# Patient Record
Sex: FEMALE | Race: OTHER | HISPANIC OR LATINO
[De-identification: names, ages, dates, MRNs, and addresses within clinical notes are randomized per-mention and may not be internally consistent; named-entity substitution may affect disease eponyms.]

---

## 2017-01-18 ENCOUNTER — APPOINTMENT (OUTPATIENT)
Dept: OBGYN | Facility: CLINIC | Age: 32
End: 2017-01-18

## 2017-04-18 ENCOUNTER — EMERGENCY (EMERGENCY)
Facility: HOSPITAL | Age: 32
LOS: 1 days | Discharge: HOME | End: 2017-04-18
Attending: EMERGENCY MEDICINE
Payer: SELF-PAY

## 2017-04-18 VITALS
TEMPERATURE: 98 F | DIASTOLIC BLOOD PRESSURE: 82 MMHG | RESPIRATION RATE: 20 BRPM | HEART RATE: 87 BPM | SYSTOLIC BLOOD PRESSURE: 126 MMHG | WEIGHT: 141.98 LBS | OXYGEN SATURATION: 98 % | HEIGHT: 59 IN

## 2017-04-18 VITALS
TEMPERATURE: 98 F | OXYGEN SATURATION: 99 % | RESPIRATION RATE: 18 BRPM | DIASTOLIC BLOOD PRESSURE: 71 MMHG | SYSTOLIC BLOOD PRESSURE: 121 MMHG | HEART RATE: 77 BPM

## 2017-04-18 DIAGNOSIS — M54.2 CERVICALGIA: ICD-10-CM
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
## 2017-04-18 LAB — HCG UR QL: NEGATIVE — SIGNIFICANT CHANGE UP

## 2017-04-18 PROCEDURE — 99284 EMERGENCY DEPT VISIT MOD MDM: CPT

## 2017-04-18 PROCEDURE — 72125 CT NECK SPINE W/O DYE: CPT | Mod: 26

## 2017-04-18 PROCEDURE — 70450 CT HEAD/BRAIN W/O DYE: CPT

## 2017-04-18 PROCEDURE — 81025 URINE PREGNANCY TEST: CPT

## 2017-04-18 PROCEDURE — 70450 CT HEAD/BRAIN W/O DYE: CPT | Mod: 26

## 2017-04-18 PROCEDURE — 72125 CT NECK SPINE W/O DYE: CPT

## 2017-04-18 RX ORDER — DIAZEPAM 5 MG
1 TABLET ORAL
Qty: 15 | Refills: 0
Start: 2017-04-18 | End: 2017-04-23

## 2017-04-18 RX ORDER — IBUPROFEN 200 MG
600 TABLET ORAL ONCE
Qty: 0 | Refills: 0 | Status: COMPLETED | OUTPATIENT
Start: 2017-04-18 | End: 2017-04-18

## 2017-04-18 RX ORDER — IBUPROFEN 200 MG
1 TABLET ORAL
Qty: 20 | Refills: 0
Start: 2017-04-18 | End: 2017-04-23

## 2017-04-18 RX ORDER — DIAZEPAM 5 MG
5 TABLET ORAL ONCE
Qty: 0 | Refills: 0 | Status: DISCONTINUED | OUTPATIENT
Start: 2017-04-18 | End: 2017-04-18

## 2017-04-18 RX ADMIN — Medication 600 MILLIGRAM(S): at 14:14

## 2017-04-18 RX ADMIN — Medication 5 MILLIGRAM(S): at 12:53

## 2017-04-18 RX ADMIN — Medication 600 MILLIGRAM(S): at 12:53
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
## 2017-04-18 NOTE — ED PROVIDER NOTE - NS ED MD SCRIBE ATTENDING SCRIBE SECTIONS
HIV/DISPOSITION/PHYSICAL EXAM/PROGRESS NOTE/VITAL SIGNS( Pullset)/REVIEW OF SYSTEMS/HISTORY OF PRESENT ILLNESS

## 2019-01-23 ENCOUNTER — ASOB RESULT (OUTPATIENT)
Age: 34
End: 2019-01-23

## 2019-01-23 ENCOUNTER — APPOINTMENT (OUTPATIENT)
Dept: OBGYN | Facility: CLINIC | Age: 34
End: 2019-01-23
Payer: MEDICAID

## 2019-01-23 ENCOUNTER — NON-APPOINTMENT (OUTPATIENT)
Age: 34
End: 2019-01-23

## 2019-01-23 ENCOUNTER — LABORATORY RESULT (OUTPATIENT)
Age: 34
End: 2019-01-23

## 2019-01-23 VITALS — WEIGHT: 177 LBS | SYSTOLIC BLOOD PRESSURE: 120 MMHG | DIASTOLIC BLOOD PRESSURE: 80 MMHG

## 2019-01-23 PROCEDURE — 99213 OFFICE O/P EST LOW 20 MIN: CPT | Mod: TH

## 2019-01-23 PROCEDURE — 76801 OB US < 14 WKS SINGLE FETUS: CPT

## 2019-02-07 ENCOUNTER — APPOINTMENT (OUTPATIENT)
Dept: ANTEPARTUM | Facility: CLINIC | Age: 34
End: 2019-02-07
Payer: MEDICAID

## 2019-02-07 ENCOUNTER — ASOB RESULT (OUTPATIENT)
Age: 34
End: 2019-02-07

## 2019-02-07 PROCEDURE — 36415 COLL VENOUS BLD VENIPUNCTURE: CPT

## 2019-02-07 PROCEDURE — 76801 OB US < 14 WKS SINGLE FETUS: CPT

## 2019-02-07 PROCEDURE — 76813 OB US NUCHAL MEAS 1 GEST: CPT

## 2019-02-07 PROCEDURE — 99204 OFFICE O/P NEW MOD 45 MIN: CPT | Mod: 25,TH

## 2019-02-21 ENCOUNTER — APPOINTMENT (OUTPATIENT)
Dept: OBGYN | Facility: CLINIC | Age: 34
End: 2019-02-21
Payer: MEDICAID

## 2019-02-21 ENCOUNTER — ASOB RESULT (OUTPATIENT)
Age: 34
End: 2019-02-21

## 2019-02-21 ENCOUNTER — NON-APPOINTMENT (OUTPATIENT)
Age: 34
End: 2019-02-21

## 2019-02-21 VITALS — WEIGHT: 180 LBS | DIASTOLIC BLOOD PRESSURE: 80 MMHG | SYSTOLIC BLOOD PRESSURE: 120 MMHG

## 2019-02-21 PROCEDURE — 99213 OFFICE O/P EST LOW 20 MIN: CPT | Mod: TH

## 2019-02-21 PROCEDURE — 76815 OB US LIMITED FETUS(S): CPT

## 2019-03-21 ENCOUNTER — NON-APPOINTMENT (OUTPATIENT)
Age: 34
End: 2019-03-21

## 2019-03-21 ENCOUNTER — APPOINTMENT (OUTPATIENT)
Dept: OBGYN | Facility: CLINIC | Age: 34
End: 2019-03-21
Payer: MEDICAID

## 2019-03-21 VITALS — DIASTOLIC BLOOD PRESSURE: 70 MMHG | SYSTOLIC BLOOD PRESSURE: 115 MMHG | WEIGHT: 183 LBS

## 2019-03-21 PROCEDURE — 99213 OFFICE O/P EST LOW 20 MIN: CPT | Mod: TH

## 2019-04-03 ENCOUNTER — APPOINTMENT (OUTPATIENT)
Dept: OBGYN | Facility: CLINIC | Age: 34
End: 2019-04-03
Payer: MEDICAID

## 2019-04-03 ENCOUNTER — ASOB RESULT (OUTPATIENT)
Age: 34
End: 2019-04-03

## 2019-04-03 PROCEDURE — 76815 OB US LIMITED FETUS(S): CPT

## 2019-04-04 ENCOUNTER — APPOINTMENT (OUTPATIENT)
Dept: ANTEPARTUM | Facility: CLINIC | Age: 34
End: 2019-04-04
Payer: MEDICAID

## 2019-04-04 ENCOUNTER — CLINICAL ADVICE (OUTPATIENT)
Age: 34
End: 2019-04-04

## 2019-04-04 ENCOUNTER — ASOB RESULT (OUTPATIENT)
Age: 34
End: 2019-04-04

## 2019-04-04 PROCEDURE — 76817 TRANSVAGINAL US OBSTETRIC: CPT

## 2019-04-04 PROCEDURE — 76811 OB US DETAILED SNGL FETUS: CPT

## 2019-04-04 RX ORDER — HYDROXYPROGESTERONE CAPROATE 250 MG/ML
250 INJECTION INTRAMUSCULAR
Qty: 2 | Refills: 6 | Status: ACTIVE | COMMUNITY
Start: 2019-04-04 | End: 1900-01-01

## 2019-04-15 ENCOUNTER — APPOINTMENT (OUTPATIENT)
Dept: OBGYN | Facility: CLINIC | Age: 34
End: 2019-04-15
Payer: MEDICAID

## 2019-04-15 ENCOUNTER — NON-APPOINTMENT (OUTPATIENT)
Age: 34
End: 2019-04-15

## 2019-04-15 VITALS — WEIGHT: 187 LBS | SYSTOLIC BLOOD PRESSURE: 122 MMHG | DIASTOLIC BLOOD PRESSURE: 70 MMHG

## 2019-04-15 PROCEDURE — 99213 OFFICE O/P EST LOW 20 MIN: CPT | Mod: TH

## 2019-04-18 ENCOUNTER — ASOB RESULT (OUTPATIENT)
Age: 34
End: 2019-04-18

## 2019-04-18 ENCOUNTER — APPOINTMENT (OUTPATIENT)
Dept: ANTEPARTUM | Facility: CLINIC | Age: 34
End: 2019-04-18
Payer: MEDICAID

## 2019-04-18 PROCEDURE — 76816 OB US FOLLOW-UP PER FETUS: CPT

## 2019-04-18 PROCEDURE — 76817 TRANSVAGINAL US OBSTETRIC: CPT

## 2019-04-23 ENCOUNTER — NON-APPOINTMENT (OUTPATIENT)
Age: 34
End: 2019-04-23

## 2019-04-23 ENCOUNTER — APPOINTMENT (OUTPATIENT)
Dept: OBGYN | Facility: CLINIC | Age: 34
End: 2019-04-23
Payer: MEDICAID

## 2019-04-23 VITALS — DIASTOLIC BLOOD PRESSURE: 70 MMHG | WEIGHT: 186 LBS | SYSTOLIC BLOOD PRESSURE: 123 MMHG

## 2019-04-23 PROCEDURE — 99213 OFFICE O/P EST LOW 20 MIN: CPT | Mod: TH

## 2019-04-30 ENCOUNTER — APPOINTMENT (OUTPATIENT)
Dept: OBGYN | Facility: CLINIC | Age: 34
End: 2019-04-30
Payer: MEDICAID

## 2019-04-30 ENCOUNTER — NON-APPOINTMENT (OUTPATIENT)
Age: 34
End: 2019-04-30

## 2019-04-30 VITALS — DIASTOLIC BLOOD PRESSURE: 72 MMHG | SYSTOLIC BLOOD PRESSURE: 118 MMHG | WEIGHT: 185 LBS

## 2019-04-30 PROCEDURE — 99213 OFFICE O/P EST LOW 20 MIN: CPT | Mod: TH

## 2019-05-02 ENCOUNTER — ASOB RESULT (OUTPATIENT)
Age: 34
End: 2019-05-02

## 2019-05-02 ENCOUNTER — APPOINTMENT (OUTPATIENT)
Dept: ANTEPARTUM | Facility: CLINIC | Age: 34
End: 2019-05-02
Payer: MEDICAID

## 2019-05-02 PROCEDURE — 76817 TRANSVAGINAL US OBSTETRIC: CPT

## 2019-05-02 PROCEDURE — 76816 OB US FOLLOW-UP PER FETUS: CPT

## 2019-05-13 ENCOUNTER — APPOINTMENT (OUTPATIENT)
Dept: OBGYN | Facility: CLINIC | Age: 34
End: 2019-05-13
Payer: MEDICAID

## 2019-05-13 ENCOUNTER — NON-APPOINTMENT (OUTPATIENT)
Age: 34
End: 2019-05-13

## 2019-05-13 VITALS — DIASTOLIC BLOOD PRESSURE: 70 MMHG | WEIGHT: 186 LBS | SYSTOLIC BLOOD PRESSURE: 118 MMHG

## 2019-05-13 PROCEDURE — 99213 OFFICE O/P EST LOW 20 MIN: CPT | Mod: TH

## 2019-05-21 ENCOUNTER — APPOINTMENT (OUTPATIENT)
Dept: OBGYN | Facility: CLINIC | Age: 34
End: 2019-05-21
Payer: MEDICAID

## 2019-05-21 ENCOUNTER — NON-APPOINTMENT (OUTPATIENT)
Age: 34
End: 2019-05-21

## 2019-05-21 PROCEDURE — 99213 OFFICE O/P EST LOW 20 MIN: CPT | Mod: TH

## 2019-05-28 ENCOUNTER — NON-APPOINTMENT (OUTPATIENT)
Age: 34
End: 2019-05-28

## 2019-05-28 ENCOUNTER — APPOINTMENT (OUTPATIENT)
Dept: OBGYN | Facility: CLINIC | Age: 34
End: 2019-05-28
Payer: MEDICAID

## 2019-05-28 VITALS — WEIGHT: 190 LBS | SYSTOLIC BLOOD PRESSURE: 105 MMHG | DIASTOLIC BLOOD PRESSURE: 70 MMHG

## 2019-05-28 PROCEDURE — 99213 OFFICE O/P EST LOW 20 MIN: CPT | Mod: TH

## 2019-06-10 ENCOUNTER — APPOINTMENT (OUTPATIENT)
Dept: OBGYN | Facility: CLINIC | Age: 34
End: 2019-06-10
Payer: MEDICAID

## 2019-06-10 ENCOUNTER — NON-APPOINTMENT (OUTPATIENT)
Age: 34
End: 2019-06-10

## 2019-06-10 VITALS — SYSTOLIC BLOOD PRESSURE: 120 MMHG | WEIGHT: 190 LBS | DIASTOLIC BLOOD PRESSURE: 70 MMHG

## 2019-06-10 PROCEDURE — 99213 OFFICE O/P EST LOW 20 MIN: CPT | Mod: TH

## 2019-07-01 ENCOUNTER — APPOINTMENT (OUTPATIENT)
Dept: OBGYN | Facility: CLINIC | Age: 34
End: 2019-07-01
Payer: MEDICAID

## 2019-07-01 ENCOUNTER — NON-APPOINTMENT (OUTPATIENT)
Age: 34
End: 2019-07-01

## 2019-07-01 VITALS — WEIGHT: 190 LBS | DIASTOLIC BLOOD PRESSURE: 78 MMHG | SYSTOLIC BLOOD PRESSURE: 115 MMHG

## 2019-07-01 DIAGNOSIS — N76.0 ACUTE VAGINITIS: ICD-10-CM

## 2019-07-01 DIAGNOSIS — B96.89 ACUTE VAGINITIS: ICD-10-CM

## 2019-07-01 PROCEDURE — 99213 OFFICE O/P EST LOW 20 MIN: CPT | Mod: TH

## 2019-07-01 RX ORDER — METRONIDAZOLE 7.5 MG/G
0.75 GEL VAGINAL
Qty: 1 | Refills: 0 | Status: ACTIVE | COMMUNITY
Start: 2019-07-01 | End: 1900-01-01

## 2019-07-16 ENCOUNTER — APPOINTMENT (OUTPATIENT)
Dept: OBGYN | Facility: CLINIC | Age: 34
End: 2019-07-16
Payer: MEDICAID

## 2019-07-16 ENCOUNTER — NON-APPOINTMENT (OUTPATIENT)
Age: 34
End: 2019-07-16

## 2019-07-16 VITALS — WEIGHT: 188 LBS | DIASTOLIC BLOOD PRESSURE: 72 MMHG | SYSTOLIC BLOOD PRESSURE: 118 MMHG

## 2019-07-16 PROCEDURE — 99213 OFFICE O/P EST LOW 20 MIN: CPT | Mod: TH

## 2019-08-05 ENCOUNTER — APPOINTMENT (OUTPATIENT)
Dept: OBGYN | Facility: CLINIC | Age: 34
End: 2019-08-05
Payer: MEDICAID

## 2019-08-05 ENCOUNTER — NON-APPOINTMENT (OUTPATIENT)
Age: 34
End: 2019-08-05

## 2019-08-05 ENCOUNTER — LABORATORY RESULT (OUTPATIENT)
Age: 34
End: 2019-08-05

## 2019-08-05 VITALS — DIASTOLIC BLOOD PRESSURE: 70 MMHG | WEIGHT: 188 LBS | SYSTOLIC BLOOD PRESSURE: 116 MMHG

## 2019-08-05 PROCEDURE — 99213 OFFICE O/P EST LOW 20 MIN: CPT | Mod: TH

## 2019-08-12 ENCOUNTER — APPOINTMENT (OUTPATIENT)
Dept: OBGYN | Facility: CLINIC | Age: 34
End: 2019-08-12
Payer: MEDICAID

## 2019-08-12 ENCOUNTER — NON-APPOINTMENT (OUTPATIENT)
Age: 34
End: 2019-08-12

## 2019-08-12 VITALS — DIASTOLIC BLOOD PRESSURE: 68 MMHG | WEIGHT: 188 LBS | SYSTOLIC BLOOD PRESSURE: 115 MMHG

## 2019-08-12 PROCEDURE — 99213 OFFICE O/P EST LOW 20 MIN: CPT | Mod: TH

## 2019-08-20 ENCOUNTER — APPOINTMENT (OUTPATIENT)
Dept: OBGYN | Facility: CLINIC | Age: 34
End: 2019-08-20
Payer: MEDICAID

## 2019-08-20 ENCOUNTER — NON-APPOINTMENT (OUTPATIENT)
Age: 34
End: 2019-08-20

## 2019-08-20 ENCOUNTER — OUTPATIENT (OUTPATIENT)
Dept: OUTPATIENT SERVICES | Facility: HOSPITAL | Age: 34
LOS: 1 days | End: 2019-08-20
Payer: MEDICAID

## 2019-08-20 VITALS — SYSTOLIC BLOOD PRESSURE: 116 MMHG | WEIGHT: 190 LBS | DIASTOLIC BLOOD PRESSURE: 69 MMHG

## 2019-08-20 DIAGNOSIS — Z3A.00 WEEKS OF GESTATION OF PREGNANCY NOT SPECIFIED: ICD-10-CM

## 2019-08-20 DIAGNOSIS — O26.899 OTHER SPECIFIED PREGNANCY RELATED CONDITIONS, UNSPECIFIED TRIMESTER: ICD-10-CM

## 2019-08-20 PROCEDURE — 59025 FETAL NON-STRESS TEST: CPT

## 2019-08-20 PROCEDURE — 99213 OFFICE O/P EST LOW 20 MIN: CPT | Mod: TH

## 2019-08-20 PROCEDURE — G0463: CPT

## 2019-08-22 ENCOUNTER — OUTPATIENT (OUTPATIENT)
Dept: OUTPATIENT SERVICES | Facility: HOSPITAL | Age: 34
LOS: 1 days | End: 2019-08-22
Payer: MEDICAID

## 2019-08-22 DIAGNOSIS — O26.899 OTHER SPECIFIED PREGNANCY RELATED CONDITIONS, UNSPECIFIED TRIMESTER: ICD-10-CM

## 2019-08-22 DIAGNOSIS — Z3A.00 WEEKS OF GESTATION OF PREGNANCY NOT SPECIFIED: ICD-10-CM

## 2019-08-22 PROCEDURE — G0463: CPT

## 2019-08-22 PROCEDURE — 59025 FETAL NON-STRESS TEST: CPT

## 2019-08-26 ENCOUNTER — INPATIENT (INPATIENT)
Facility: HOSPITAL | Age: 34
LOS: 2 days | Discharge: HOME | End: 2019-08-29
Attending: OBSTETRICS & GYNECOLOGY | Admitting: OBSTETRICS & GYNECOLOGY
Payer: MEDICAID

## 2019-08-26 ENCOUNTER — APPOINTMENT (OUTPATIENT)
Dept: OBGYN | Facility: CLINIC | Age: 34
End: 2019-08-26
Payer: MEDICAID

## 2019-08-26 ENCOUNTER — NON-APPOINTMENT (OUTPATIENT)
Age: 34
End: 2019-08-26

## 2019-08-26 VITALS — WEIGHT: 189 LBS | DIASTOLIC BLOOD PRESSURE: 70 MMHG | SYSTOLIC BLOOD PRESSURE: 120 MMHG

## 2019-08-26 VITALS — WEIGHT: 189.6 LBS

## 2019-08-26 DIAGNOSIS — Z34.80 ENCOUNTER FOR SUPERVISION OF OTHER NORMAL PREGNANCY, UNSPECIFIED TRIMESTER: ICD-10-CM

## 2019-08-26 DIAGNOSIS — Z3A.00 WEEKS OF GESTATION OF PREGNANCY NOT SPECIFIED: ICD-10-CM

## 2019-08-26 DIAGNOSIS — O26.899 OTHER SPECIFIED PREGNANCY RELATED CONDITIONS, UNSPECIFIED TRIMESTER: ICD-10-CM

## 2019-08-26 LAB
APTT BLD: 31.7 SEC — SIGNIFICANT CHANGE UP (ref 27.5–36.3)
BASOPHILS # BLD AUTO: 0.05 K/UL — SIGNIFICANT CHANGE UP (ref 0–0.2)
BASOPHILS NFR BLD AUTO: 0.4 % — SIGNIFICANT CHANGE UP (ref 0–2)
EOSINOPHIL # BLD AUTO: 0.16 K/UL — SIGNIFICANT CHANGE UP (ref 0–0.5)
EOSINOPHIL NFR BLD AUTO: 1.4 % — SIGNIFICANT CHANGE UP (ref 0–6)
FIBRINOGEN PPP-MCNC: 678 MG/DL — HIGH (ref 350–510)
HCT VFR BLD CALC: 37.2 % — SIGNIFICANT CHANGE UP (ref 34.5–45)
HGB BLD-MCNC: 12.3 G/DL — SIGNIFICANT CHANGE UP (ref 11.5–15.5)
HIV 1 & 2 AB SERPL IA.RAPID: SIGNIFICANT CHANGE UP
IMM GRANULOCYTES NFR BLD AUTO: 0.5 % — SIGNIFICANT CHANGE UP (ref 0–1.5)
INR BLD: 0.98 RATIO — SIGNIFICANT CHANGE UP (ref 0.88–1.16)
LYMPHOCYTES # BLD AUTO: 2.88 K/UL — SIGNIFICANT CHANGE UP (ref 1–3.3)
LYMPHOCYTES # BLD AUTO: 24.7 % — SIGNIFICANT CHANGE UP (ref 13–44)
MCHC RBC-ENTMCNC: 29.3 PG — SIGNIFICANT CHANGE UP (ref 27–34)
MCHC RBC-ENTMCNC: 33.1 GM/DL — SIGNIFICANT CHANGE UP (ref 32–36)
MCV RBC AUTO: 88.6 FL — SIGNIFICANT CHANGE UP (ref 80–100)
MONOCYTES # BLD AUTO: 0.98 K/UL — HIGH (ref 0–0.9)
MONOCYTES NFR BLD AUTO: 8.4 % — SIGNIFICANT CHANGE UP (ref 2–14)
NEUTROPHILS # BLD AUTO: 7.52 K/UL — HIGH (ref 1.8–7.4)
NEUTROPHILS NFR BLD AUTO: 64.6 % — SIGNIFICANT CHANGE UP (ref 43–77)
NRBC # BLD: 0 /100 WBCS — SIGNIFICANT CHANGE UP (ref 0–0)
PLATELET # BLD AUTO: 325 K/UL — SIGNIFICANT CHANGE UP (ref 150–400)
PROTHROM AB SERPL-ACNC: 10.9 SEC — SIGNIFICANT CHANGE UP (ref 10–12.9)
RBC # BLD: 4.2 M/UL — SIGNIFICANT CHANGE UP (ref 3.8–5.2)
RBC # FLD: 14.2 % — SIGNIFICANT CHANGE UP (ref 10.3–14.5)
WBC # BLD: 11.65 K/UL — HIGH (ref 3.8–10.5)
WBC # FLD AUTO: 11.65 K/UL — HIGH (ref 3.8–10.5)

## 2019-08-26 PROCEDURE — 99213 OFFICE O/P EST LOW 20 MIN: CPT | Mod: TH

## 2019-08-26 RX ORDER — OXYTOCIN 10 UNIT/ML
333.33 VIAL (ML) INJECTION
Qty: 20 | Refills: 0 | Status: COMPLETED | OUTPATIENT
Start: 2019-08-26 | End: 2019-08-27

## 2019-08-26 RX ORDER — CITRIC ACID/SODIUM CITRATE 300-500 MG
15 SOLUTION, ORAL ORAL EVERY 6 HOURS
Refills: 0 | Status: DISCONTINUED | OUTPATIENT
Start: 2019-08-26 | End: 2019-08-27

## 2019-08-26 RX ORDER — SODIUM CHLORIDE 9 MG/ML
1000 INJECTION, SOLUTION INTRAVENOUS
Refills: 0 | Status: DISCONTINUED | OUTPATIENT
Start: 2019-08-26 | End: 2019-08-27

## 2019-08-26 RX ORDER — ONDANSETRON 8 MG/1
4 TABLET, FILM COATED ORAL EVERY 6 HOURS
Refills: 0 | Status: DISCONTINUED | OUTPATIENT
Start: 2019-08-26 | End: 2019-08-27

## 2019-08-26 RX ADMIN — SODIUM CHLORIDE 125 MILLILITER(S): 9 INJECTION, SOLUTION INTRAVENOUS at 23:02

## 2019-08-27 LAB
ABO RH CONFIRMATION: SIGNIFICANT CHANGE UP
HIV 1+2 AB+HIV1 P24 AG SERPL QL IA: SIGNIFICANT CHANGE UP
T PALLIDUM AB TITR SER: NEGATIVE — SIGNIFICANT CHANGE UP

## 2019-08-27 PROCEDURE — 59409 OBSTETRICAL CARE: CPT | Mod: U9

## 2019-08-27 RX ORDER — CITRIC ACID/SODIUM CITRATE 300-500 MG
30 SOLUTION, ORAL ORAL ONCE
Refills: 0 | Status: DISCONTINUED | OUTPATIENT
Start: 2019-08-27 | End: 2019-08-27

## 2019-08-27 RX ORDER — OXYTOCIN 10 UNIT/ML
333.33 VIAL (ML) INJECTION
Qty: 20 | Refills: 0 | Status: DISCONTINUED | OUTPATIENT
Start: 2019-08-27 | End: 2019-08-29

## 2019-08-27 RX ORDER — SIMETHICONE 80 MG/1
80 TABLET, CHEWABLE ORAL EVERY 4 HOURS
Refills: 0 | Status: DISCONTINUED | OUTPATIENT
Start: 2019-08-27 | End: 2019-08-29

## 2019-08-27 RX ORDER — DOCUSATE SODIUM 100 MG
100 CAPSULE ORAL
Refills: 0 | Status: DISCONTINUED | OUTPATIENT
Start: 2019-08-27 | End: 2019-08-28

## 2019-08-27 RX ORDER — AZITHROMYCIN 500 MG/1
500 TABLET, FILM COATED ORAL ONCE
Refills: 0 | Status: DISCONTINUED | OUTPATIENT
Start: 2019-08-27 | End: 2019-08-27

## 2019-08-27 RX ORDER — GLYCERIN ADULT
1 SUPPOSITORY, RECTAL RECTAL AT BEDTIME
Refills: 0 | Status: DISCONTINUED | OUTPATIENT
Start: 2019-08-27 | End: 2019-08-28

## 2019-08-27 RX ORDER — DIPHENHYDRAMINE HCL 50 MG
25 CAPSULE ORAL EVERY 6 HOURS
Refills: 0 | Status: DISCONTINUED | OUTPATIENT
Start: 2019-08-27 | End: 2019-08-29

## 2019-08-27 RX ORDER — OXYTOCIN 10 UNIT/ML
2 VIAL (ML) INJECTION
Qty: 30 | Refills: 0 | Status: DISCONTINUED | OUTPATIENT
Start: 2019-08-27 | End: 2019-08-27

## 2019-08-27 RX ORDER — SODIUM CHLORIDE 9 MG/ML
3 INJECTION INTRAMUSCULAR; INTRAVENOUS; SUBCUTANEOUS EVERY 8 HOURS
Refills: 0 | Status: DISCONTINUED | OUTPATIENT
Start: 2019-08-27 | End: 2019-08-29

## 2019-08-27 RX ORDER — ASCORBIC ACID 60 MG
500 TABLET,CHEWABLE ORAL DAILY
Refills: 0 | Status: DISCONTINUED | OUTPATIENT
Start: 2019-08-27 | End: 2019-08-29

## 2019-08-27 RX ORDER — IBUPROFEN 200 MG
600 TABLET ORAL EVERY 6 HOURS
Refills: 0 | Status: DISCONTINUED | OUTPATIENT
Start: 2019-08-27 | End: 2019-08-29

## 2019-08-27 RX ORDER — BENZOCAINE 10 %
1 GEL (GRAM) MUCOUS MEMBRANE EVERY 6 HOURS
Refills: 0 | Status: DISCONTINUED | OUTPATIENT
Start: 2019-08-27 | End: 2019-08-29

## 2019-08-27 RX ORDER — MAGNESIUM HYDROXIDE 400 MG/1
30 TABLET, CHEWABLE ORAL
Refills: 0 | Status: DISCONTINUED | OUTPATIENT
Start: 2019-08-27 | End: 2019-08-29

## 2019-08-27 RX ORDER — DIBUCAINE 1 %
1 OINTMENT (GRAM) RECTAL EVERY 6 HOURS
Refills: 0 | Status: DISCONTINUED | OUTPATIENT
Start: 2019-08-27 | End: 2019-08-29

## 2019-08-27 RX ORDER — LANOLIN
1 OINTMENT (GRAM) TOPICAL EVERY 6 HOURS
Refills: 0 | Status: DISCONTINUED | OUTPATIENT
Start: 2019-08-27 | End: 2019-08-29

## 2019-08-27 RX ORDER — CEFAZOLIN SODIUM 1 G
2000 VIAL (EA) INJECTION ONCE
Refills: 0 | Status: DISCONTINUED | OUTPATIENT
Start: 2019-08-27 | End: 2019-08-27

## 2019-08-27 RX ORDER — PRAMOXINE HYDROCHLORIDE 150 MG/15G
1 AEROSOL, FOAM RECTAL EVERY 4 HOURS
Refills: 0 | Status: DISCONTINUED | OUTPATIENT
Start: 2019-08-27 | End: 2019-08-29

## 2019-08-27 RX ORDER — TETANUS TOXOID, REDUCED DIPHTHERIA TOXOID AND ACELLULAR PERTUSSIS VACCINE, ADSORBED 5; 2.5; 8; 8; 2.5 [IU]/.5ML; [IU]/.5ML; UG/.5ML; UG/.5ML; UG/.5ML
0.5 SUSPENSION INTRAMUSCULAR ONCE
Refills: 0 | Status: DISCONTINUED | OUTPATIENT
Start: 2019-08-27 | End: 2019-08-29

## 2019-08-27 RX ORDER — ACETAMINOPHEN 500 MG
975 TABLET ORAL EVERY 8 HOURS
Refills: 0 | Status: DISCONTINUED | OUTPATIENT
Start: 2019-08-27 | End: 2019-08-29

## 2019-08-27 RX ORDER — AER TRAVELER 0.5 G/1
1 SOLUTION RECTAL; TOPICAL EVERY 4 HOURS
Refills: 0 | Status: DISCONTINUED | OUTPATIENT
Start: 2019-08-27 | End: 2019-08-29

## 2019-08-27 RX ORDER — HYDROCORTISONE 1 %
1 OINTMENT (GRAM) TOPICAL EVERY 6 HOURS
Refills: 0 | Status: DISCONTINUED | OUTPATIENT
Start: 2019-08-27 | End: 2019-08-29

## 2019-08-27 RX ADMIN — Medication 600 MILLIGRAM(S): at 23:55

## 2019-08-27 RX ADMIN — Medication 100 MILLIGRAM(S): at 23:18

## 2019-08-27 RX ADMIN — SODIUM CHLORIDE 3 MILLILITER(S): 9 INJECTION INTRAMUSCULAR; INTRAVENOUS; SUBCUTANEOUS at 21:48

## 2019-08-27 RX ADMIN — SODIUM CHLORIDE 3 MILLILITER(S): 9 INJECTION INTRAMUSCULAR; INTRAVENOUS; SUBCUTANEOUS at 15:04

## 2019-08-27 RX ADMIN — Medication 1000 MILLIUNIT(S)/MIN: at 12:46

## 2019-08-27 RX ADMIN — Medication 600 MILLIGRAM(S): at 23:18

## 2019-08-27 RX ADMIN — Medication 1 SPRAY(S): at 23:18

## 2019-08-28 ENCOUNTER — TRANSCRIPTION ENCOUNTER (OUTPATIENT)
Age: 34
End: 2019-08-28

## 2019-08-28 DIAGNOSIS — Z37.9 OUTCOME OF DELIVERY, UNSPECIFIED: ICD-10-CM

## 2019-08-28 LAB
BASOPHILS # BLD AUTO: 0.05 K/UL — SIGNIFICANT CHANGE UP (ref 0–0.2)
BASOPHILS NFR BLD AUTO: 0.3 % — SIGNIFICANT CHANGE UP (ref 0–2)
EOSINOPHIL # BLD AUTO: 0.32 K/UL — SIGNIFICANT CHANGE UP (ref 0–0.5)
EOSINOPHIL NFR BLD AUTO: 2.2 % — SIGNIFICANT CHANGE UP (ref 0–6)
HCT VFR BLD CALC: 34.5 % — SIGNIFICANT CHANGE UP (ref 34.5–45)
HGB BLD-MCNC: 11.4 G/DL — LOW (ref 11.5–15.5)
IMM GRANULOCYTES NFR BLD AUTO: 0.6 % — SIGNIFICANT CHANGE UP (ref 0–1.5)
LYMPHOCYTES # BLD AUTO: 21.6 % — SIGNIFICANT CHANGE UP (ref 13–44)
LYMPHOCYTES # BLD AUTO: 3.15 K/UL — SIGNIFICANT CHANGE UP (ref 1–3.3)
MCHC RBC-ENTMCNC: 29.4 PG — SIGNIFICANT CHANGE UP (ref 27–34)
MCHC RBC-ENTMCNC: 33 GM/DL — SIGNIFICANT CHANGE UP (ref 32–36)
MCV RBC AUTO: 88.9 FL — SIGNIFICANT CHANGE UP (ref 80–100)
MONOCYTES # BLD AUTO: 0.89 K/UL — SIGNIFICANT CHANGE UP (ref 0–0.9)
MONOCYTES NFR BLD AUTO: 6.1 % — SIGNIFICANT CHANGE UP (ref 2–14)
NEUTROPHILS # BLD AUTO: 10.08 K/UL — HIGH (ref 1.8–7.4)
NEUTROPHILS NFR BLD AUTO: 69.2 % — SIGNIFICANT CHANGE UP (ref 43–77)
NRBC # BLD: 0 /100 WBCS — SIGNIFICANT CHANGE UP (ref 0–0)
PLATELET # BLD AUTO: 298 K/UL — SIGNIFICANT CHANGE UP (ref 150–400)
RBC # BLD: 3.88 M/UL — SIGNIFICANT CHANGE UP (ref 3.8–5.2)
RBC # FLD: 14.3 % — SIGNIFICANT CHANGE UP (ref 10.3–14.5)
WBC # BLD: 14.58 K/UL — HIGH (ref 3.8–10.5)
WBC # FLD AUTO: 14.58 K/UL — HIGH (ref 3.8–10.5)

## 2019-08-28 RX ORDER — SENNA PLUS 8.6 MG/1
2 TABLET ORAL AT BEDTIME
Refills: 0 | Status: DISCONTINUED | OUTPATIENT
Start: 2019-08-28 | End: 2019-08-29

## 2019-08-28 RX ORDER — DOCUSATE SODIUM 100 MG
100 CAPSULE ORAL
Refills: 0 | Status: DISCONTINUED | OUTPATIENT
Start: 2019-08-28 | End: 2019-08-29

## 2019-08-28 RX ORDER — FERROUS SULFATE 325(65) MG
325 TABLET ORAL DAILY
Refills: 0 | Status: DISCONTINUED | OUTPATIENT
Start: 2019-08-28 | End: 2019-08-29

## 2019-08-28 RX ADMIN — Medication 500 MILLIGRAM(S): at 12:13

## 2019-08-28 RX ADMIN — Medication 600 MILLIGRAM(S): at 23:00

## 2019-08-28 RX ADMIN — Medication 100 MILLIGRAM(S): at 18:06

## 2019-08-28 RX ADMIN — SODIUM CHLORIDE 3 MILLILITER(S): 9 INJECTION INTRAMUSCULAR; INTRAVENOUS; SUBCUTANEOUS at 14:00

## 2019-08-28 RX ADMIN — Medication 100 MILLIGRAM(S): at 05:48

## 2019-08-28 RX ADMIN — Medication 975 MILLIGRAM(S): at 07:53

## 2019-08-28 RX ADMIN — Medication 600 MILLIGRAM(S): at 16:00

## 2019-08-28 RX ADMIN — Medication 600 MILLIGRAM(S): at 05:48

## 2019-08-28 RX ADMIN — Medication 975 MILLIGRAM(S): at 08:00

## 2019-08-28 RX ADMIN — SENNA PLUS 2 TABLET(S): 8.6 TABLET ORAL at 22:07

## 2019-08-28 RX ADMIN — Medication 600 MILLIGRAM(S): at 22:27

## 2019-08-28 RX ADMIN — Medication 1 TABLET(S): at 12:13

## 2019-08-28 RX ADMIN — Medication 1 SPRAY(S): at 18:07

## 2019-08-28 RX ADMIN — SODIUM CHLORIDE 3 MILLILITER(S): 9 INJECTION INTRAMUSCULAR; INTRAVENOUS; SUBCUTANEOUS at 22:07

## 2019-08-28 RX ADMIN — Medication 600 MILLIGRAM(S): at 06:15

## 2019-08-28 RX ADMIN — Medication 600 MILLIGRAM(S): at 15:16

## 2019-08-28 RX ADMIN — SODIUM CHLORIDE 3 MILLILITER(S): 9 INJECTION INTRAMUSCULAR; INTRAVENOUS; SUBCUTANEOUS at 05:45

## 2019-08-28 NOTE — DISCHARGE NOTE OB - HOSPITAL COURSE
Pt admitted for oligohydramnios at 40.3 weeks s/p vavd. post partum care and breastfeeding instructions provided. normal couse of labor and delivery

## 2019-08-28 NOTE — DISCHARGE NOTE OB - MATERIALS PROVIDED
Breastfeeding Guide and Packet/Birth Certificate Instructions/Bottle Feeding Log/Shaken Baby Prevention Handout/  Immunization Record/Breastfeeding Log/Guide to Postpartum Care/[facility]  Screening Program

## 2019-08-28 NOTE — PROGRESS NOTE ADULT - PROBLEM SELECTOR PLAN 1
A/P: 34y/oAMA PPD#1 s/p [id number],stable  -Continue pain management, postpartum care  -Encourage OOB and ambulation  -Plan for discharge tomorrow  -d/w Dr. Velásquez

## 2019-08-28 NOTE — DISCHARGE NOTE OB - CARE PROVIDERS DIRECT ADDRESSES
[email or web address]

## 2019-08-28 NOTE — PROGRESS NOTE ADULT - SUBJECTIVE AND OBJECTIVE BOX
OB PA Progress Note PPD#1    Patient seen at bedside resting comfortably offers no current complaints. Ambulating and voiding without difficulty.  Passing flatus and tolerating regular diet.  both breast/bottle feeding . Denies HA, CP, SOB, N/V/D, dizziness, palpitations, worsening abdominal pain, worsening vaginal bleeding, or any other concerns.    Vital Signs Last 24 Hrs  T(C): 36.6 (28 Aug 2019 05:49), Max: 36.9 (27 Aug 2019 21:09)  T(F): 97.9 (28 Aug 2019 05:49), Max: 98.5 (27 Aug 2019 21:09)  HR: 69 (28 Aug 2019 05:49) (69 - 82)  BP: 100/63 (28 Aug 2019 05:49) (97/55 - 127/68)  BP(mean): 84 (27 Aug 2019 13:45) (81 - 86)  RR: 17 (28 Aug 2019 05:49) (16 - 18)  SpO2: 99% (28 Aug 2019 05:49) (97% - 99%)    Physical Exam:     Gen: A&Ox 3, NAD  Breast: Soft, nontender, nonengorged  Abdomen: +BS, Soft, nontender,  ND; Fundus firm below umbilicus  Gyn: mod lochia, repaired  Ext: Nontender, DTRS 2+, no worsening edema                          11.4   14.58 )-----------( 298      ( 28 Aug 2019 06:51 )             34.5

## 2019-08-28 NOTE — PROGRESS NOTE ADULT - ASSESSMENT
A/P: 34y/oAMA PPD#1 s/p [id number],stable  -Continue pain management, postpartum care  -Encourage OOB and ambulation  -Plan for discharge tomorrow  -d/w Dr. Velásquez

## 2019-08-28 NOTE — DISCHARGE NOTE OB - MEDICATION SUMMARY - MEDICATIONS TO TAKE
I will START or STAY ON the medications listed below when I get home from the hospital:    ibuprofen 600 mg oral tablet  -- 1 tab(s) by mouth every 6 hours   -- Do not take this drug if you are pregnant.  It is very important that you take or use this exactly as directed.  Do not skip doses or discontinue unless directed by your doctor.  May cause drowsiness or dizziness.  Obtain medical advice before taking any non-prescription drugs as some may affect the action of this medication.  Take with food or milk.    -- Indication: For As needed for pain    Prenatal Multivitamins with Folic Acid 1 mg oral tablet  -- 1 tab(s) by mouth once a day  -- Indication: For Breastfeeding    Colace 100 mg oral capsule  -- 1 cap(s) by mouth 2 times a day   -- Medication should be taken with plenty of water.    -- Indication: For Stool softener/constipation

## 2019-08-28 NOTE — DISCHARGE NOTE OB - CARE PLAN
Principal Discharge DX:	Vacuum-assisted vaginal delivery  Goal:	post partum care, pain management, breastfeeding  Assessment and plan of treatment:	No sex, no pushing, no heavy lifting, no strenuous activity, Nothing per vagina x  6 weeks - no sex, tampons, douching, tub baths, etc. Continue breastfeeding.  Motrin as needed for pain. Follow up in office in 5-6 weeks for post partum check up. Please call for appt.

## 2019-08-28 NOTE — DISCHARGE NOTE OB - MEDICATION SUMMARY - MEDICATIONS TO STOP TAKING
I will STOP taking the medications listed below when I get home from the hospital:    diazepam 5 mg oral tablet  -- 1 tab(s) by mouth every 8 hours, As Needed - for muscle spasm MDD:3  -- Caution federal law prohibits the transfer of this drug to any person other  than the person for whom it was prescribed.  Do not take this drug if you are pregnant.  May cause drowsiness.  Alcohol may intensify this effect.  Use care when operating dangerous machinery.

## 2019-08-28 NOTE — DISCHARGE NOTE OB - CARE PROVIDER_API CALL
David Velásquez)  Obstetrics and Gynecology  [address] [facility], [place] [address]  Phone: [phone]  Fax: [phone]  Follow Up Time:

## 2019-08-28 NOTE — DISCHARGE NOTE OB - PATIENT PORTAL LINK FT
You can access the FollowMyHealth Patient Portal offered by [facility] by registering at the following website: [email or web address]. By joining [facility]’s FollowMyHealth portal, you will also be able to view your health information using other applications (apps) compatible with our system.

## 2019-08-29 VITALS
OXYGEN SATURATION: 99 % | RESPIRATION RATE: 17 BRPM | DIASTOLIC BLOOD PRESSURE: 76 MMHG | SYSTOLIC BLOOD PRESSURE: 116 MMHG | HEART RATE: 66 BPM | TEMPERATURE: 98 F

## 2019-08-29 PROCEDURE — 86850 RBC ANTIBODY SCREEN: CPT

## 2019-08-29 PROCEDURE — 87389 HIV-1 AG W/HIV-1&-2 AB AG IA: CPT

## 2019-08-29 PROCEDURE — 36415 COLL VENOUS BLD VENIPUNCTURE: CPT

## 2019-08-29 PROCEDURE — 85610 PROTHROMBIN TIME: CPT

## 2019-08-29 PROCEDURE — 59025 FETAL NON-STRESS TEST: CPT

## 2019-08-29 PROCEDURE — 85730 THROMBOPLASTIN TIME PARTIAL: CPT

## 2019-08-29 PROCEDURE — G0463: CPT

## 2019-08-29 PROCEDURE — 86901 BLOOD TYPING SEROLOGIC RH(D): CPT

## 2019-08-29 PROCEDURE — 86780 TREPONEMA PALLIDUM: CPT

## 2019-08-29 PROCEDURE — 85384 FIBRINOGEN ACTIVITY: CPT

## 2019-08-29 PROCEDURE — 86900 BLOOD TYPING SEROLOGIC ABO: CPT

## 2019-08-29 PROCEDURE — 86923 COMPATIBILITY TEST ELECTRIC: CPT

## 2019-08-29 PROCEDURE — 59050 FETAL MONITOR W/REPORT: CPT

## 2019-08-29 PROCEDURE — 85027 COMPLETE CBC AUTOMATED: CPT

## 2019-08-29 PROCEDURE — 86703 HIV-1/HIV-2 1 RESULT ANTBDY: CPT

## 2019-08-29 RX ORDER — IBUPROFEN 200 MG
1 TABLET ORAL
Qty: 30 | Refills: 0
Start: 2019-08-29

## 2019-08-29 RX ORDER — DOCUSATE SODIUM 100 MG
1 CAPSULE ORAL
Qty: 30 | Refills: 0
Start: 2019-08-29

## 2019-08-29 RX ADMIN — SODIUM CHLORIDE 3 MILLILITER(S): 9 INJECTION INTRAMUSCULAR; INTRAVENOUS; SUBCUTANEOUS at 05:35

## 2019-08-29 RX ADMIN — Medication 600 MILLIGRAM(S): at 06:05

## 2019-08-29 RX ADMIN — Medication 600 MILLIGRAM(S): at 05:34

## 2019-08-29 RX ADMIN — Medication 100 MILLIGRAM(S): at 05:33

## 2019-08-29 NOTE — PROGRESS NOTE ADULT - SUBJECTIVE AND OBJECTIVE BOX
Patient seen resting comfortably.  No current complaints.  Denies HA, CP, SOB, N/V/D, dizziness, palpitations, worsening abdominal pain, worsening vaginal bleeding, or any other concerns.  Ambulating and voiding without difficulty.  Passing flatus and tolerating regular diet.  Attempting breastfeeding.     Vital Signs Last 24 Hrs  T(C): 36.8 (29 Aug 2019 06:16), Max: 36.8 (28 Aug 2019 18:25)  T(F): 98.3 (29 Aug 2019 06:16), Max: 98.3 (29 Aug 2019 06:16)  HR: 66 (29 Aug 2019 06:16) (66 - 69)  BP: 116/76 (29 Aug 2019 06:16) (103/63 - 116/76)  RR: 17 (29 Aug 2019 06:16) (16 - 17)  SpO2: 99% (29 Aug 2019 06:16) (96% - 99%)    Physical Exam:     Gen: A&Ox 3, NAD  Chest: CTABL  Cardiac: S1+S2+ RRR  Breast: Soft, nontender, nonengorged  Abdomen: Soft, nontender, Fundus firm below umbilicus, +BS  Gyn: Mild lochia, intact/repaired  Extremities: Nontender, DTRS 2+, no worsening edema                           11.4   14.58 )-----------( 298      ( 28 Aug 2019 06:51 )             34.5            A/P:  Patient is a 34 year old P3 s/p .  Postpartum day two in stable condition     - Discharge home with instructions  -Follow up in office in 5-6 weeks for postpartum visit  -Breastfeeding encouraged   -Continue pain management  -Encourage OOB and ambulation  -Continue current care      Attending aware

## 2019-10-07 ENCOUNTER — APPOINTMENT (OUTPATIENT)
Dept: OBGYN | Facility: CLINIC | Age: 34
End: 2019-10-07

## 2019-10-23 ENCOUNTER — APPOINTMENT (OUTPATIENT)
Dept: OBGYN | Facility: CLINIC | Age: 34
End: 2019-10-23
Payer: MEDICAID

## 2019-10-23 VITALS — DIASTOLIC BLOOD PRESSURE: 70 MMHG | WEIGHT: 189 LBS | SYSTOLIC BLOOD PRESSURE: 120 MMHG

## 2019-10-23 DIAGNOSIS — Z87.440 PERSONAL HISTORY OF URINARY (TRACT) INFECTIONS: ICD-10-CM

## 2019-10-23 PROCEDURE — 99212 OFFICE O/P EST SF 10 MIN: CPT | Mod: TH

## 2019-10-23 RX ORDER — METRONIDAZOLE 7.5 MG/G
0.75 GEL VAGINAL
Qty: 1 | Refills: 0 | Status: ACTIVE | COMMUNITY
Start: 2019-05-21 | End: 1900-01-01

## 2019-10-23 RX ORDER — MULTIVIT-MIN/FOLIC/VIT K/LYCOP 400-300MCG
28-0.8 TABLET ORAL DAILY
Qty: 30 | Refills: 11 | Status: ACTIVE | COMMUNITY
Start: 2019-01-23 | End: 1900-01-01

## 2019-10-23 NOTE — HISTORY OF PRESENT ILLNESS
[Postpartum Follow Up] : postpartum follow up [Complications:___] : no complications [Breastfeeding] : not currently nursing [] : delivered by vaginal delivery [S/Sx PP Depression] : no signs/symptoms of postpartum depression [Erythema] : not erythematous [Back to Normal] : is back to normal in size [Mild] : mild vaginal bleeding [Normal] : the vagina was normal [Cervix Sample Taken] : cervical sample not taken for a Pap smear [Not Done] : Examination of breasts not done [Doing Well] : is doing well [No Sign of Infection] : is showing no signs of infection [Excellent Pain Control] : has excellent pain control [FreeTextEntry9] : has dysuria and frequency , she wants tubal ligation .  [None] : None [de-identified] :  , uti ,  [de-identified] : cipro , tubal laparoascopic .

## 2019-11-05 ENCOUNTER — APPOINTMENT (OUTPATIENT)
Dept: OBGYN | Facility: CLINIC | Age: 34
End: 2019-11-05

## 2020-01-08 ENCOUNTER — OUTPATIENT (OUTPATIENT)
Dept: OUTPATIENT SERVICES | Facility: HOSPITAL | Age: 35
LOS: 1 days | End: 2020-01-08
Payer: MEDICAID

## 2020-01-08 VITALS
HEIGHT: 59 IN | DIASTOLIC BLOOD PRESSURE: 72 MMHG | WEIGHT: 149.91 LBS | SYSTOLIC BLOOD PRESSURE: 107 MMHG | RESPIRATION RATE: 18 BRPM | TEMPERATURE: 97 F | OXYGEN SATURATION: 98 % | HEART RATE: 67 BPM

## 2020-01-08 DIAGNOSIS — Z01.818 ENCOUNTER FOR OTHER PREPROCEDURAL EXAMINATION: ICD-10-CM

## 2020-01-08 DIAGNOSIS — Z30.2 ENCOUNTER FOR STERILIZATION: ICD-10-CM

## 2020-01-08 DIAGNOSIS — Z98.890 OTHER SPECIFIED POSTPROCEDURAL STATES: Chronic | ICD-10-CM

## 2020-01-08 LAB
ANION GAP SERPL CALC-SCNC: 6 MMOL/L — SIGNIFICANT CHANGE UP (ref 5–17)
APTT BLD: 36.5 SEC — HIGH (ref 27.5–36.3)
BLD GP AB SCN SERPL QL: SIGNIFICANT CHANGE UP
BUN SERPL-MCNC: 11 MG/DL — SIGNIFICANT CHANGE UP (ref 7–18)
CALCIUM SERPL-MCNC: 8.9 MG/DL — SIGNIFICANT CHANGE UP (ref 8.4–10.5)
CHLORIDE SERPL-SCNC: 106 MMOL/L — SIGNIFICANT CHANGE UP (ref 96–108)
CO2 SERPL-SCNC: 27 MMOL/L — SIGNIFICANT CHANGE UP (ref 22–31)
CREAT SERPL-MCNC: 0.6 MG/DL — SIGNIFICANT CHANGE UP (ref 0.5–1.3)
GLUCOSE SERPL-MCNC: 88 MG/DL — SIGNIFICANT CHANGE UP (ref 70–99)
HCT VFR BLD CALC: 39.7 % — SIGNIFICANT CHANGE UP (ref 34.5–45)
HGB BLD-MCNC: 13.3 G/DL — SIGNIFICANT CHANGE UP (ref 11.5–15.5)
MCHC RBC-ENTMCNC: 28.6 PG — SIGNIFICANT CHANGE UP (ref 27–34)
MCHC RBC-ENTMCNC: 33.5 GM/DL — SIGNIFICANT CHANGE UP (ref 32–36)
MCV RBC AUTO: 85.4 FL — SIGNIFICANT CHANGE UP (ref 80–100)
NRBC # BLD: 0 /100 WBCS — SIGNIFICANT CHANGE UP (ref 0–0)
PLATELET # BLD AUTO: 347 K/UL — SIGNIFICANT CHANGE UP (ref 150–400)
POTASSIUM SERPL-MCNC: 3.9 MMOL/L — SIGNIFICANT CHANGE UP (ref 3.5–5.3)
POTASSIUM SERPL-SCNC: 3.9 MMOL/L — SIGNIFICANT CHANGE UP (ref 3.5–5.3)
RBC # BLD: 4.65 M/UL — SIGNIFICANT CHANGE UP (ref 3.8–5.2)
RBC # FLD: 13 % — SIGNIFICANT CHANGE UP (ref 10.3–14.5)
SODIUM SERPL-SCNC: 139 MMOL/L — SIGNIFICANT CHANGE UP (ref 135–145)
WBC # BLD: 7.82 K/UL — SIGNIFICANT CHANGE UP (ref 3.8–10.5)
WBC # FLD AUTO: 7.82 K/UL — SIGNIFICANT CHANGE UP (ref 3.8–10.5)

## 2020-01-08 PROCEDURE — G0463: CPT

## 2020-01-08 RX ORDER — SODIUM CHLORIDE 9 MG/ML
3 INJECTION INTRAMUSCULAR; INTRAVENOUS; SUBCUTANEOUS EVERY 8 HOURS
Refills: 0 | Status: DISCONTINUED | OUTPATIENT
Start: 2020-01-14 | End: 2020-01-14

## 2020-01-08 NOTE — H&P PST ADULT - NSICDXPROBLEM_GEN_ALL_CORE_FT
PROBLEM DIAGNOSES  Problem: Encounter for sterilization  Assessment and Plan: Patient is scheduled for laporascopiv bilateral tubal ligation on 1/14/2020. Preop instructions given .

## 2020-01-08 NOTE — H&P PST ADULT - VISION (WITH CORRECTIVE LENSES IF THE PATIENT USUALLY WEARS THEM):
wears eyeglasses for distance/Partially impaired: cannot see medication labels or newsprint, but can see obstacles in path, and the surrounding layout; can count fingers at arm's length

## 2020-01-14 ENCOUNTER — OUTPATIENT (OUTPATIENT)
Dept: OUTPATIENT SERVICES | Facility: HOSPITAL | Age: 35
LOS: 1 days | End: 2020-01-14
Payer: MEDICAID

## 2020-01-14 VITALS
DIASTOLIC BLOOD PRESSURE: 60 MMHG | TEMPERATURE: 98 F | HEART RATE: 72 BPM | OXYGEN SATURATION: 96 % | RESPIRATION RATE: 14 BRPM | SYSTOLIC BLOOD PRESSURE: 102 MMHG

## 2020-01-14 VITALS
DIASTOLIC BLOOD PRESSURE: 47 MMHG | HEIGHT: 59 IN | WEIGHT: 149.91 LBS | SYSTOLIC BLOOD PRESSURE: 97 MMHG | RESPIRATION RATE: 16 BRPM | OXYGEN SATURATION: 100 % | TEMPERATURE: 98 F | HEART RATE: 59 BPM

## 2020-01-14 DIAGNOSIS — Z30.2 ENCOUNTER FOR STERILIZATION: ICD-10-CM

## 2020-01-14 DIAGNOSIS — Z98.890 OTHER SPECIFIED POSTPROCEDURAL STATES: Chronic | ICD-10-CM

## 2020-01-14 DIAGNOSIS — Z01.818 ENCOUNTER FOR OTHER PREPROCEDURAL EXAMINATION: ICD-10-CM

## 2020-01-14 LAB
BLD GP AB SCN SERPL QL: SIGNIFICANT CHANGE UP
HCG UR QL: NEGATIVE — SIGNIFICANT CHANGE UP

## 2020-01-14 PROCEDURE — 81025 URINE PREGNANCY TEST: CPT

## 2020-01-14 PROCEDURE — 86900 BLOOD TYPING SEROLOGIC ABO: CPT

## 2020-01-14 PROCEDURE — 58670 LAPAROSCOPY TUBAL CAUTERY: CPT

## 2020-01-14 PROCEDURE — 86901 BLOOD TYPING SEROLOGIC RH(D): CPT

## 2020-01-14 PROCEDURE — 86850 RBC ANTIBODY SCREEN: CPT

## 2020-01-14 RX ORDER — ACETAMINOPHEN 500 MG
975 TABLET ORAL ONCE
Refills: 0 | Status: DISCONTINUED | OUTPATIENT
Start: 2020-01-14 | End: 2020-02-04

## 2020-01-14 RX ORDER — IBUPROFEN 200 MG
3 TABLET ORAL
Qty: 0 | Refills: 0 | DISCHARGE

## 2020-01-14 RX ORDER — OXYCODONE HYDROCHLORIDE 5 MG/1
5 TABLET ORAL ONCE
Refills: 0 | Status: DISCONTINUED | OUTPATIENT
Start: 2020-01-14 | End: 2020-02-04

## 2020-01-14 RX ORDER — ACETAMINOPHEN 500 MG
2 TABLET ORAL
Qty: 0 | Refills: 0 | DISCHARGE

## 2020-01-14 RX ORDER — HYDROMORPHONE HYDROCHLORIDE 2 MG/ML
0.5 INJECTION INTRAMUSCULAR; INTRAVENOUS; SUBCUTANEOUS
Refills: 0 | Status: DISCONTINUED | OUTPATIENT
Start: 2020-01-14 | End: 2020-01-14

## 2020-01-14 RX ORDER — SODIUM CHLORIDE 9 MG/ML
1000 INJECTION, SOLUTION INTRAVENOUS
Refills: 0 | Status: DISCONTINUED | OUTPATIENT
Start: 2020-01-14 | End: 2020-02-04

## 2020-01-14 RX ORDER — SODIUM CHLORIDE 9 MG/ML
1000 INJECTION, SOLUTION INTRAVENOUS
Refills: 0 | Status: DISCONTINUED | OUTPATIENT
Start: 2020-01-14 | End: 2020-01-14

## 2020-01-14 RX ADMIN — HYDROMORPHONE HYDROCHLORIDE 0.5 MILLIGRAM(S): 2 INJECTION INTRAMUSCULAR; INTRAVENOUS; SUBCUTANEOUS at 16:00

## 2020-01-14 RX ADMIN — SODIUM CHLORIDE 3 MILLILITER(S): 9 INJECTION INTRAMUSCULAR; INTRAVENOUS; SUBCUTANEOUS at 12:26

## 2020-01-14 NOTE — ASU DISCHARGE PLAN (ADULT/PEDIATRIC) - ASU DC SPECIAL INSTRUCTIONSFT
Please follow up with Dr. Velásquez in two weeks for your postop checkup.  Call the office for your appointment.

## 2020-01-14 NOTE — ASU DISCHARGE PLAN (ADULT/PEDIATRIC) - NO HEAVY LIFTING DURATION
No heavy lifting or strenuous activity. Nothing heavier than 10 pounds until cleared by Dr Velásquez

## 2020-01-14 NOTE — ASU DISCHARGE PLAN (ADULT/PEDIATRIC) - CALL YOUR DOCTOR IF YOU HAVE ANY OF THE FOLLOWING:
Numbness, tingling, color or temperature change to extremity/Nausea and vomiting that does not stop/Fever greater than (need to indicate Fahrenheit or Celsius)/Pain not relieved by Medications/Bleeding that does not stop/Inability to tolerate liquids or foods/Unable to urinate/Wound/Surgical Site with redness, or foul smelling discharge or pus

## 2020-01-14 NOTE — ASU DISCHARGE PLAN (ADULT/PEDIATRIC) - ACTIVITY LEVEL
No intercourse/No tub baths/Nothing per vagina/No douching/No tampons/2 weeks 2 weeks/No heavy lifting/No tampons/No douching/Nothing per vagina/No tub baths/No intercourse

## 2020-01-14 NOTE — BRIEF OPERATIVE NOTE - OPERATION/FINDINGS
EUA: small mobile axial uterus, no adnexal masses palpable b/l  LSC: grossly normal uterus/tubes/ovaries b/l

## 2020-01-28 ENCOUNTER — EMERGENCY (EMERGENCY)
Facility: HOSPITAL | Age: 35
LOS: 1 days | Discharge: HOME | End: 2020-01-28
Attending: EMERGENCY MEDICINE
Payer: MEDICAID

## 2020-01-28 VITALS
DIASTOLIC BLOOD PRESSURE: 67 MMHG | TEMPERATURE: 98 F | OXYGEN SATURATION: 98 % | SYSTOLIC BLOOD PRESSURE: 107 MMHG | WEIGHT: 162.04 LBS | HEIGHT: 59 IN | RESPIRATION RATE: 18 BRPM | HEART RATE: 84 BPM

## 2020-01-28 DIAGNOSIS — L03.90 CELLULITIS, UNSPECIFIED: ICD-10-CM

## 2020-01-28 DIAGNOSIS — Z98.890 OTHER SPECIFIED POSTPROCEDURAL STATES: Chronic | ICD-10-CM

## 2020-01-28 LAB
BASOPHILS # BLD AUTO: 0.06 K/UL — SIGNIFICANT CHANGE UP (ref 0–0.2)
BASOPHILS NFR BLD AUTO: 0.6 % — SIGNIFICANT CHANGE UP (ref 0–2)
EOSINOPHIL # BLD AUTO: 0.39 K/UL — SIGNIFICANT CHANGE UP (ref 0–0.5)
EOSINOPHIL NFR BLD AUTO: 3.8 % — SIGNIFICANT CHANGE UP (ref 0–6)
HCT VFR BLD CALC: 38.4 % — SIGNIFICANT CHANGE UP (ref 34.5–45)
HGB BLD-MCNC: 12.9 G/DL — SIGNIFICANT CHANGE UP (ref 11.5–15.5)
IMM GRANULOCYTES NFR BLD AUTO: 0.6 % — SIGNIFICANT CHANGE UP (ref 0–1.5)
LYMPHOCYTES # BLD AUTO: 3.89 K/UL — HIGH (ref 1–3.3)
LYMPHOCYTES # BLD AUTO: 38.1 % — SIGNIFICANT CHANGE UP (ref 13–44)
MCHC RBC-ENTMCNC: 29 PG — SIGNIFICANT CHANGE UP (ref 27–34)
MCHC RBC-ENTMCNC: 33.6 GM/DL — SIGNIFICANT CHANGE UP (ref 32–36)
MCV RBC AUTO: 86.3 FL — SIGNIFICANT CHANGE UP (ref 80–100)
MONOCYTES # BLD AUTO: 0.78 K/UL — SIGNIFICANT CHANGE UP (ref 0–0.9)
MONOCYTES NFR BLD AUTO: 7.6 % — SIGNIFICANT CHANGE UP (ref 2–14)
NEUTROPHILS # BLD AUTO: 5.02 K/UL — SIGNIFICANT CHANGE UP (ref 1.8–7.4)
NEUTROPHILS NFR BLD AUTO: 49.3 % — SIGNIFICANT CHANGE UP (ref 43–77)
NRBC # BLD: 0 /100 WBCS — SIGNIFICANT CHANGE UP (ref 0–0)
PLATELET # BLD AUTO: 356 K/UL — SIGNIFICANT CHANGE UP (ref 150–400)
RBC # BLD: 4.45 M/UL — SIGNIFICANT CHANGE UP (ref 3.8–5.2)
RBC # FLD: 12.5 % — SIGNIFICANT CHANGE UP (ref 10.3–14.5)
WBC # BLD: 10.2 K/UL — SIGNIFICANT CHANGE UP (ref 3.8–10.5)
WBC # FLD AUTO: 10.2 K/UL — SIGNIFICANT CHANGE UP (ref 3.8–10.5)

## 2020-01-28 PROCEDURE — 99284 EMERGENCY DEPT VISIT MOD MDM: CPT

## 2020-01-28 RX ORDER — KETOROLAC TROMETHAMINE 30 MG/ML
30 SYRINGE (ML) INJECTION ONCE
Refills: 0 | Status: DISCONTINUED | OUTPATIENT
Start: 2020-01-28 | End: 2020-01-28

## 2020-01-28 RX ORDER — VANCOMYCIN HCL 1 G
1000 VIAL (EA) INTRAVENOUS ONCE
Refills: 0 | Status: COMPLETED | OUTPATIENT
Start: 2020-01-28 | End: 2020-01-28

## 2020-01-28 NOTE — ED PROVIDER NOTE - PATIENT PORTAL LINK FT
You can access the FollowMyHealth Patient Portal offered by [facility] by registering at the following website: [email or web address]. By joining [facility]’s FollowMyHealth portal, you will also be able to view your health information using other applications (apps) compatible with our system.

## 2020-01-28 NOTE — CONSULT NOTE ADULT - ASSESSMENT
a/p 33y/o s/p laparoscopic BTL POD#14 with cellulitis, afebrile  pain mngt  cbc w/diff  will reassess  dw Dr. Blancas Elrosa attending

## 2020-01-28 NOTE — CONSULT NOTE ADULT - PROBLEM SELECTOR RECOMMENDATION 9
a/p 35y/o s/p laparoscopic BTL POD#14 with cellulitis, afebrile  pain mngt  cbc w/diff  will reassess  dw Dr. Blancas Bastian attending

## 2020-01-28 NOTE — CONSULT NOTE ADULT - SUBJECTIVE AND OBJECTIVE BOX
GYN PA Consult Note    35y/o P3 s/p laparoscopic BTL on 1/14 presented with c/o wound drainage and pain at the umbilicus x2 days. Patient states the incision started to drain yesterday and patient used alcohol swab to clean the area and this morning incision crusted and skin became red and painful. Patient denies fever, chills, diaphoresis, palpitations and dizziness.    Vital Signs Last 24 Hrs  T(C): 36.7 (28 Jan 2020 22:10), Max: 36.7 (28 Jan 2020 22:10)  T(F): 98 (28 Jan 2020 22:10), Max: 98 (28 Jan 2020 22:10)  HR: 84 (28 Jan 2020 22:10) (84 - 84)  BP: 107/67 (28 Jan 2020 22:10) (107/67 - 107/67)  BP(mean): --  RR: 18 (28 Jan 2020 22:10) (18 - 18)  SpO2: 98% (28 Jan 2020 22:10) (98% - 98%)    gen aox3, not in acute distress, non toxic appearing  abd incision at umbilicus clear discharge, crusting, erythematous tender, no bleeding or odor, qtip applied, fascia intact, no evidence of seroma    a/p 35y/o s/p laparoscopic BTL POD#14 with cellulitis, afebrile  pain mngt  cbc w/diff  will reassess  dw Dr. Blancas Shelby attending GYN PA Consult Note    35y/o P3 s/p laparoscopic BTL on 1/14 presented with c/o wound drainage and pain at the umbilicus x2 days. Patient states the incision started to drain yesterday and patient used alcohol swab to clean the area and this morning incision crusted and skin became red and painful. Patient denies fever, chills, diaphoresis, palpitations and dizziness.    Vital Signs Last 24 Hrs  T(C): 36.7 (28 Jan 2020 22:10), Max: 36.7 (28 Jan 2020 22:10)  T(F): 98 (28 Jan 2020 22:10), Max: 98 (28 Jan 2020 22:10)  HR: 84 (28 Jan 2020 22:10) (84 - 84)  BP: 107/67 (28 Jan 2020 22:10) (107/67 - 107/67)  BP(mean): --  RR: 18 (28 Jan 2020 22:10) (18 - 18)  SpO2: 98% (28 Jan 2020 22:10) (98% - 98%)    gen aox3, not in acute distress, non toxic appearing  abd incision at umbilicus clear discharge, crusting, erythematous tender, no bleeding or odor, qtip applied, fascia intact, no evidence of seroma; incision in the lower abdomen is clean/dry and intact    a/p 35y/o s/p laparoscopic BTL POD#14 with cellulitis, afebrile  pain mngt  cbc w/diff  will reassess  dw Dr. Yonny levy attending

## 2020-01-28 NOTE — ED PROVIDER NOTE - OBJECTIVE STATEMENT
Chief complaint  of umbilical discharge, erythema and  tenderness s/p laparoscopic tubal ligation on 1/14/20. Chief complaint  of umbilical crusty discharge, mild erythema and tenderness s/p laparoscopic tubal ligation on 1/14/20.

## 2020-01-28 NOTE — ED PROVIDER NOTE - NSFOLLOWUPINSTRUCTIONS_ED_ALL_ED_FT
Return if pain, fever, redness or discharge from umbilical area, any concerns.  See your doctor as soon as possible (within 1-2 days).   If you need further assistance for appointments you can contact the [facility] Care Coordinator at [phone] or the [facility] Patient Access Services helpline at [phone] to find names/contact #s for a practitioner to follow up with.  Bring your discharge papers / test results with you to any follow up appointments.   In addition our outpatient [facility] is located at [address] [place] [address], tel: [phone].

## 2020-01-28 NOTE — ED PROVIDER NOTE - PROGRESS NOTE DETAILS
OBTENNILLE post will consult, agrees with current management. WYATT Polk evaluated pt in ER, requested WBC level prior to any imaging. Pt developed generalized pruritis during IV vanco infusion. Infusion held. Benadryl, solu-medrol and pepcid ordered. Pt feeling better. No signs of angioedema.

## 2020-01-28 NOTE — ED PROVIDER NOTE - CARE PROVIDER_API CALL
David Velásquez)  Obstetrics and Gynecology  [address], [place] [address]  Phone: [phone]  Fax: [phone]  Follow Up Time:

## 2020-01-28 NOTE — ED PROVIDER NOTE - CLINICAL SUMMARY MEDICAL DECISION MAKING FREE TEXT BOX
1:35a- Pruritis resolved, pt feels better. Dw OBGYN service Felicitas METCALF pt to be discharged with Keflex and f/u with Dr. Elliott tomorrow.  Pt is well appearing, has no new complaints and able to walk with normal gait. Pt is stable for discharge and follow up with medical doctor. Pt educated on care and need for follow up. Discussed anticipatory guidance and return precautions. Questions answered. I had a detailed discussion with the patient and/or guardian regarding the historical points, exam findings, and any diagnostic results supporting the discharge diagnosis.

## 2020-01-29 VITALS
TEMPERATURE: 98 F | DIASTOLIC BLOOD PRESSURE: 62 MMHG | RESPIRATION RATE: 16 BRPM | HEART RATE: 89 BPM | SYSTOLIC BLOOD PRESSURE: 102 MMHG | OXYGEN SATURATION: 99 %

## 2020-01-29 LAB
ALBUMIN SERPL ELPH-MCNC: 3.8 G/DL — SIGNIFICANT CHANGE UP (ref 3.5–5)
ALP SERPL-CCNC: 122 U/L — HIGH (ref 40–120)
ALT FLD-CCNC: 71 U/L DA — HIGH (ref 10–60)
ANION GAP SERPL CALC-SCNC: 6 MMOL/L — SIGNIFICANT CHANGE UP (ref 5–17)
APPEARANCE UR: ABNORMAL
APTT BLD: 35.6 SEC — SIGNIFICANT CHANGE UP (ref 27.5–36.3)
AST SERPL-CCNC: 24 U/L — SIGNIFICANT CHANGE UP (ref 10–40)
BILIRUB SERPL-MCNC: 0.2 MG/DL — SIGNIFICANT CHANGE UP (ref 0.2–1.2)
BILIRUB UR-MCNC: NEGATIVE — SIGNIFICANT CHANGE UP
BUN SERPL-MCNC: 13 MG/DL — SIGNIFICANT CHANGE UP (ref 7–18)
CALCIUM SERPL-MCNC: 8.9 MG/DL — SIGNIFICANT CHANGE UP (ref 8.4–10.5)
CHLORIDE SERPL-SCNC: 106 MMOL/L — SIGNIFICANT CHANGE UP (ref 96–108)
CO2 SERPL-SCNC: 27 MMOL/L — SIGNIFICANT CHANGE UP (ref 22–31)
COLOR SPEC: YELLOW — SIGNIFICANT CHANGE UP
CREAT SERPL-MCNC: 0.72 MG/DL — SIGNIFICANT CHANGE UP (ref 0.5–1.3)
DIFF PNL FLD: NEGATIVE — SIGNIFICANT CHANGE UP
GLUCOSE SERPL-MCNC: 115 MG/DL — HIGH (ref 70–99)
GLUCOSE UR QL: NEGATIVE — SIGNIFICANT CHANGE UP
INR BLD: 1.03 RATIO — SIGNIFICANT CHANGE UP (ref 0.88–1.16)
KETONES UR-MCNC: ABNORMAL
LACTATE SERPL-SCNC: 0.9 MMOL/L — SIGNIFICANT CHANGE UP (ref 0.7–2)
LEUKOCYTE ESTERASE UR-ACNC: ABNORMAL
NITRITE UR-MCNC: NEGATIVE — SIGNIFICANT CHANGE UP
PH UR: 5 — SIGNIFICANT CHANGE UP (ref 5–8)
POTASSIUM SERPL-MCNC: 3.6 MMOL/L — SIGNIFICANT CHANGE UP (ref 3.5–5.3)
POTASSIUM SERPL-SCNC: 3.6 MMOL/L — SIGNIFICANT CHANGE UP (ref 3.5–5.3)
PROT SERPL-MCNC: 7.7 G/DL — SIGNIFICANT CHANGE UP (ref 6–8.3)
PROT UR-MCNC: 15
PROTHROM AB SERPL-ACNC: 11.4 SEC — SIGNIFICANT CHANGE UP (ref 10–12.9)
SODIUM SERPL-SCNC: 139 MMOL/L — SIGNIFICANT CHANGE UP (ref 135–145)
SP GR SPEC: 1.02 — SIGNIFICANT CHANGE UP (ref 1.01–1.02)
UROBILINOGEN FLD QL: 1

## 2020-01-29 PROCEDURE — 81001 URINALYSIS AUTO W/SCOPE: CPT

## 2020-01-29 PROCEDURE — 87040 BLOOD CULTURE FOR BACTERIA: CPT

## 2020-01-29 PROCEDURE — 85027 COMPLETE CBC AUTOMATED: CPT

## 2020-01-29 PROCEDURE — 83605 ASSAY OF LACTIC ACID: CPT

## 2020-01-29 PROCEDURE — 96374 THER/PROPH/DIAG INJ IV PUSH: CPT

## 2020-01-29 PROCEDURE — 36415 COLL VENOUS BLD VENIPUNCTURE: CPT

## 2020-01-29 PROCEDURE — 85730 THROMBOPLASTIN TIME PARTIAL: CPT

## 2020-01-29 PROCEDURE — 85610 PROTHROMBIN TIME: CPT

## 2020-01-29 PROCEDURE — 96375 TX/PRO/DX INJ NEW DRUG ADDON: CPT

## 2020-01-29 PROCEDURE — 80053 COMPREHEN METABOLIC PANEL: CPT

## 2020-01-29 PROCEDURE — 87086 URINE CULTURE/COLONY COUNT: CPT

## 2020-01-29 PROCEDURE — 99284 EMERGENCY DEPT VISIT MOD MDM: CPT | Mod: 25

## 2020-01-29 RX ORDER — ACETAMINOPHEN 500 MG
2 TABLET ORAL
Qty: 20 | Refills: 0
Start: 2020-01-29

## 2020-01-29 RX ORDER — FAMOTIDINE 10 MG/ML
20 INJECTION INTRAVENOUS ONCE
Refills: 0 | Status: COMPLETED | OUTPATIENT
Start: 2020-01-29 | End: 2020-01-29

## 2020-01-29 RX ORDER — CEPHALEXIN 500 MG
1 CAPSULE ORAL
Qty: 28 | Refills: 0
Start: 2020-01-29 | End: 2020-02-04

## 2020-01-29 RX ORDER — DIPHENHYDRAMINE HCL 50 MG
50 CAPSULE ORAL ONCE
Refills: 0 | Status: COMPLETED | OUTPATIENT
Start: 2020-01-29 | End: 2020-01-29

## 2020-01-29 RX ORDER — CEPHALEXIN 500 MG
500 CAPSULE ORAL ONCE
Refills: 0 | Status: COMPLETED | OUTPATIENT
Start: 2020-01-29 | End: 2020-01-29

## 2020-01-29 RX ADMIN — Medication 250 MILLIGRAM(S): at 00:06

## 2020-01-29 RX ADMIN — Medication 50 MILLIGRAM(S): at 01:01

## 2020-01-29 RX ADMIN — Medication 30 MILLIGRAM(S): at 01:39

## 2020-01-29 RX ADMIN — Medication 500 MILLIGRAM(S): at 01:57

## 2020-01-29 RX ADMIN — Medication 125 MILLIGRAM(S): at 01:01

## 2020-01-29 RX ADMIN — Medication 30 MILLIGRAM(S): at 00:06

## 2020-01-29 RX ADMIN — FAMOTIDINE 20 MILLIGRAM(S): 10 INJECTION INTRAVENOUS at 01:01

## 2020-01-29 NOTE — CHART NOTE - NSCHARTNOTEFT_GEN_A_CORE
GYN PA Focused Note    wbc 10.2  patient is afebrile, normotensive, HR 77  low suspicion for abscess at this time, suspected cellulitis  dc home with PO keflex 500mg bid x7days  discharge instructions given, clean wound with soap and water  f/u with Dr. Velásquez on Thursday at 1pm  dw Dr. Blancas Wawaka attending  dw Dr. Velásquez

## 2020-01-30 ENCOUNTER — APPOINTMENT (OUTPATIENT)
Dept: OBGYN | Facility: CLINIC | Age: 35
End: 2020-01-30
Payer: MEDICAID

## 2020-01-30 VITALS — DIASTOLIC BLOOD PRESSURE: 82 MMHG | SYSTOLIC BLOOD PRESSURE: 126 MMHG | WEIGHT: 186 LBS

## 2020-01-30 LAB
CULTURE RESULTS: SIGNIFICANT CHANGE UP
SPECIMEN SOURCE: SIGNIFICANT CHANGE UP

## 2020-01-30 NOTE — HISTORY OF PRESENT ILLNESS
[0/10] : no pain reported [Fever] : no fever [Chills] : no chills [Nausea] : no nausea [Clean/Dry/Intact] : clean, dry and intact [Vomiting] : no vomiting [Erythema] : not erythematous [Normal] : the vagina was normal [Doing Well] : is doing well [Excellent Pain Control] : has excellent pain control [No Sign of Infection] : is showing no signs of infection [None] : None [de-identified] : happy . Was seen in the er for insicion pain . She currently has none . She didn't take the antibiotics .  [de-identified] : sutures removed  [de-identified] : s/p tubal  [de-identified] : rto for pap . Wound care discussed .

## 2020-02-03 LAB
CULTURE RESULTS: SIGNIFICANT CHANGE UP
CULTURE RESULTS: SIGNIFICANT CHANGE UP
SPECIMEN SOURCE: SIGNIFICANT CHANGE UP
SPECIMEN SOURCE: SIGNIFICANT CHANGE UP

## 2020-02-07 NOTE — ED ADULT NURSE NOTE - NSIMPLEMENTINTERV_GEN_ALL_ED
[FreeTextEntry1] : Inflamed finger tips around warts. and large eroded area on medial R foot from treatment for wart. \par Also scratching several other areas stated with keflex tx. \par Look more inflamed than infected. \par Do not think other antibiotics by mouth now worth the risk of a reaction, does not look like a cellulitis. \par Looks more as if the use of salicylic acid to tx areas is causing a reaction. \par Advised mother to return to Dr Garvin now, or to Dr Phillips for a second opinion. \par RTO at once for any spread of the pinkness of fingers beyond first phalanx crease, or if pus, fever or pain in digits, or in eroded foot area. \par Advised mother to take photos of lesions. \par Stop salicylic acid tx until seen by derm. 
Implemented All Universal Safety Interventions:  [place] to call system. Call bell, personal items and telephone within reach. Instruct patient to call for assistance. Room bathroom lighting operational. Non-slip footwear when patient is off stretcher. Physically safe environment: no spills, clutter or unnecessary equipment. Stretcher in lowest position, wheels locked, appropriate side rails in place.

## 2020-06-24 ENCOUNTER — LABORATORY RESULT (OUTPATIENT)
Age: 35
End: 2020-06-24

## 2020-06-25 ENCOUNTER — LABORATORY RESULT (OUTPATIENT)
Age: 35
End: 2020-06-25

## 2020-06-25 ENCOUNTER — APPOINTMENT (OUTPATIENT)
Dept: OBGYN | Facility: CLINIC | Age: 35
End: 2020-06-25
Payer: MEDICAID

## 2020-06-25 VITALS
DIASTOLIC BLOOD PRESSURE: 72 MMHG | HEIGHT: 59 IN | WEIGHT: 179 LBS | SYSTOLIC BLOOD PRESSURE: 124 MMHG | BODY MASS INDEX: 36.08 KG/M2

## 2020-06-25 PROCEDURE — 99395 PREV VISIT EST AGE 18-39: CPT

## 2020-06-25 NOTE — PHYSICAL EXAM
[Alert] : alert [Awake] : awake [Soft] : soft [Oriented x3] : oriented to person, place, and time [Normal] : cervix [Cystocele] : a cystocele [No Bleeding] : there was no active vaginal bleeding [Uterine Adnexae] : were not tender and not enlarged [RRR, No Murmurs] : RRR, no murmurs [CTAB] : CTAB [Acute Distress] : no acute distress [Mass] : no breast mass [Tender] : non tender [Nipple Discharge] : no nipple discharge [Axillary LAD] : no axillary lymphadenopathy

## 2020-09-23 ENCOUNTER — LABORATORY RESULT (OUTPATIENT)
Age: 35
End: 2020-09-23

## 2020-09-24 ENCOUNTER — APPOINTMENT (OUTPATIENT)
Dept: OBGYN | Facility: CLINIC | Age: 35
End: 2020-09-24
Payer: MEDICAID

## 2020-09-24 VITALS — DIASTOLIC BLOOD PRESSURE: 72 MMHG | SYSTOLIC BLOOD PRESSURE: 104 MMHG | WEIGHT: 166 LBS | BODY MASS INDEX: 33.53 KG/M2

## 2020-09-24 PROCEDURE — 99213 OFFICE O/P EST LOW 20 MIN: CPT

## 2020-09-24 NOTE — HISTORY OF PRESENT ILLNESS
[TextBox_4] : pt comes for f/u she feels good . Has been doing the kegel exercises but the incontinence will not get any better . She wants to have urodynamics and even surgical therapy if necessary .

## 2020-10-28 ENCOUNTER — APPOINTMENT (OUTPATIENT)
Dept: OBGYN | Facility: CLINIC | Age: 35
End: 2020-10-28
Payer: MEDICAID

## 2020-10-28 DIAGNOSIS — N39.3 STRESS INCONTINENCE (FEMALE) (MALE): ICD-10-CM

## 2020-10-28 PROCEDURE — 99072 ADDL SUPL MATRL&STAF TM PHE: CPT

## 2020-10-28 PROCEDURE — 51729 CYSTOMETROGRAM W/VP&UP: CPT

## 2020-10-28 PROCEDURE — 51784 ANAL/URINARY MUSCLE STUDY: CPT

## 2020-10-28 PROCEDURE — 51741 ELECTRO-UROFLOWMETRY FIRST: CPT

## 2020-11-12 ENCOUNTER — APPOINTMENT (OUTPATIENT)
Dept: OBGYN | Facility: HOSPITAL | Age: 35
End: 2020-11-12
Payer: MEDICAID

## 2020-11-12 PROCEDURE — 99213 OFFICE O/P EST LOW 20 MIN: CPT | Mod: 95

## 2020-11-12 RX ORDER — TOLTERODINE TARTRATE 4 MG/1
4 CAPSULE, EXTENDED RELEASE ORAL
Qty: 1 | Refills: 6 | Status: ACTIVE | COMMUNITY
Start: 2020-11-12 | End: 1900-01-01

## 2020-11-12 NOTE — COUNSELING
[Nutrition/ Exercise/ Weight Management] : nutrition, exercise, weight management [Body Image] : body image [Vitamins/Supplements] : vitamins/supplements [Bladder Hygiene] : bladder hygiene [Vaccines] : vaccines

## 2020-11-12 NOTE — HISTORY OF PRESENT ILLNESS
[Other Location: e.g. School (Enter Location, City,State)___] : at [unfilled], at the time of the visit. [Other Location: e.g. Home (Enter Location, City,State)___] : at [unfilled] [Verbal consent obtained from patient] : the patient, [unfilled] [FreeTextEntry1] : Discussed the results of the urodynamics and management of her condition . She says that the majority of the problem is the urge . She is currently doing kegel exercises . I asked about glaucoma in the family and there was no personal or family history.

## 2020-12-21 PROBLEM — Z87.440 HISTORY OF URINARY TRACT INFECTION: Status: RESOLVED | Noted: 2019-10-23 | Resolved: 2020-12-21

## 2020-12-21 PROBLEM — N76.0 BACTERIAL VAGINOSIS: Status: RESOLVED | Noted: 2019-07-01 | Resolved: 2020-12-21

## 2021-01-05 NOTE — DISCHARGE NOTE OB - BREASTFEEDING IS NOT A FORM OF BIRTH CONTROL
[FreeTextEntry1] : The patient is a 21 year old male with mild testicular discomfort following a laparoscopic bilateral inguinal hernia repair with mesh.  The patient was advised to wear tighter fitting undergarments to support the testicles and to start Ibuprofen for the inflammation.  He will follow up in 3 weeks or sooner should any problems or issues arise. 
Statement Selected

## 2021-01-07 ENCOUNTER — APPOINTMENT (OUTPATIENT)
Dept: OBGYN | Facility: HOSPITAL | Age: 36
End: 2021-01-07
Payer: MEDICAID

## 2021-01-07 PROCEDURE — 99213 OFFICE O/P EST LOW 20 MIN: CPT | Mod: 95

## 2021-01-07 RX ORDER — CIPROFLOXACIN HYDROCHLORIDE 500 MG/1
500 TABLET, FILM COATED ORAL
Qty: 10 | Refills: 0 | Status: ACTIVE | COMMUNITY
Start: 2019-10-23 | End: 1900-01-01

## 2021-01-07 NOTE — COUNSELING
[Nutrition/ Exercise/ Weight Management] : nutrition, exercise, weight management [Body Image] : body image [Vitamins/Supplements] : vitamins/supplements [Bladder Hygiene] : bladder hygiene

## 2021-02-03 ENCOUNTER — APPOINTMENT (OUTPATIENT)
Dept: OBGYN | Facility: CLINIC | Age: 36
End: 2021-02-03

## 2021-02-04 ENCOUNTER — APPOINTMENT (OUTPATIENT)
Dept: OBGYN | Facility: CLINIC | Age: 36
End: 2021-02-04
Payer: MEDICAID

## 2021-02-04 DIAGNOSIS — N76.2 ACUTE VULVITIS: ICD-10-CM

## 2021-02-04 PROCEDURE — 99213 OFFICE O/P EST LOW 20 MIN: CPT | Mod: 95

## 2021-02-04 RX ORDER — CLOTRIMAZOLE AND BETAMETHASONE DIPROPIONATE 10; .5 MG/ML; MG/ML
1-0.05 LOTION TOPICAL TWICE DAILY
Qty: 1 | Refills: 3 | Status: ACTIVE | COMMUNITY
Start: 2021-02-04 | End: 1900-01-01

## 2021-02-04 RX ORDER — OXYBUTYNIN CHLORIDE 5 MG/1
5 TABLET, EXTENDED RELEASE ORAL
Qty: 1 | Refills: 3 | Status: ACTIVE | COMMUNITY
Start: 2021-01-07 | End: 1900-01-01

## 2021-02-04 NOTE — PHYSICAL EXAM
[Labia Majora Erythema] : erythema [Labia Minora] : normal [Normal] : normal [Uterine Adnexae] : normal

## 2021-02-04 NOTE — HISTORY OF PRESENT ILLNESS
[FreeTextEntry1] : pt is responding to the medication but she still leaks a small amount spontaneously . She also has some vulvar itching . She is also doing her kegel excercises and feels she has made significant improvement .

## 2021-02-04 NOTE — REVIEW OF SYSTEMS
[Incontinence] : incontinence [Genital Rash/Irritation] : genital rash/irritation [Negative] : Heme/Lymph

## 2021-03-18 NOTE — H&P PST ADULT - HISTORY OF PRESENT ILLNESS
Statement Selected
34 years old female  is here today for presurgical evaluation for laparoscopic bilateral tubal ligation on 2020.

## 2021-03-30 ENCOUNTER — APPOINTMENT (OUTPATIENT)
Dept: OBGYN | Facility: CLINIC | Age: 36
End: 2021-03-30
Payer: MEDICAID

## 2021-03-30 PROCEDURE — 99213 OFFICE O/P EST LOW 20 MIN: CPT | Mod: 95

## 2021-03-30 NOTE — REASON FOR VISIT
[Other Location: e.g. School (Enter Location, City,State)___] : at [unfilled], at the time of the visit. [Other Location: e.g. Home (Enter Location, City,State)___] : at [unfilled] [Verbal consent obtained from patient] : the patient, [unfilled] [Follow-Up] : a follow-up evaluation of

## 2021-03-30 NOTE — HISTORY OF PRESENT ILLNESS
[FreeTextEntry1] : pt feels much better with medication . No more incontinence and no side effects from medication . She wants to continue .

## 2021-07-01 ENCOUNTER — LABORATORY RESULT (OUTPATIENT)
Age: 36
End: 2021-07-01

## 2021-07-02 ENCOUNTER — APPOINTMENT (OUTPATIENT)
Dept: OBGYN | Facility: CLINIC | Age: 36
End: 2021-07-02
Payer: MEDICAID

## 2021-07-02 VITALS — SYSTOLIC BLOOD PRESSURE: 126 MMHG | WEIGHT: 184 LBS | DIASTOLIC BLOOD PRESSURE: 78 MMHG | BODY MASS INDEX: 37.16 KG/M2

## 2021-07-02 DIAGNOSIS — N92.6 IRREGULAR MENSTRUATION, UNSPECIFIED: ICD-10-CM

## 2021-07-02 PROCEDURE — 99395 PREV VISIT EST AGE 18-39: CPT

## 2021-07-02 RX ORDER — MEDROXYPROGESTERONE ACETATE 10 MG/1
10 TABLET ORAL DAILY
Qty: 10 | Refills: 3 | Status: ACTIVE | COMMUNITY
Start: 2021-07-02 | End: 1900-01-01

## 2021-07-02 NOTE — PLAN
[FreeTextEntry1] : pap sent\par continue with meds for incontinence \par rx provera q 3 months \par f/u for annual

## 2021-07-02 NOTE — HISTORY OF PRESENT ILLNESS
[FreeTextEntry1] : here for annual\par complains of obesity and irregular menses\par LMP 03/2021\par sexually active \par

## 2021-08-23 ENCOUNTER — APPOINTMENT (OUTPATIENT)
Dept: OBGYN | Facility: CLINIC | Age: 36
End: 2021-08-23
Payer: MEDICAID

## 2021-08-23 VITALS — WEIGHT: 181 LBS | BODY MASS INDEX: 36.56 KG/M2

## 2021-08-23 DIAGNOSIS — N64.4 MASTODYNIA: ICD-10-CM

## 2021-08-23 PROCEDURE — 99214 OFFICE O/P EST MOD 30 MIN: CPT

## 2021-08-23 NOTE — PHYSICAL EXAM
Anesthesia Evaluation     . Pt has had prior anesthetic. Type: General and MAC           ROS/MED HX    ENT/Pulmonary:     (+)GURJIT risk factors snores loudly, obese, tobacco use, Past use 0.5 PPD for  packs/day  Intermittent asthma Treatment: Inhaler prn,  , . .    Neurologic:  - neg neurologic ROS     Cardiovascular:  - neg cardiovascular ROS   (+) ----. : . . . :. . No previous cardiac testing       METS/Exercise Tolerance:  3 - Able to walk 1-2 blocks without stopping   Hematologic:  - neg hematologic  ROS       Musculoskeletal:   (+) , , other musculoskeletal- right shoulder pain, impaired gait due to muscle injury      GI/Hepatic:  - neg GI/hepatic ROS       Renal/Genitourinary:  - ROS Renal section negative       Endo:     (+) Obesity, .      Psychiatric:  - neg psychiatric ROS       Infectious Disease:  - neg infectious disease ROS       Malignancy:      - no malignancy   Other:    (+) No chance of pregnancy C-spine cleared: N/A, no H/O Chronic Pain,no other significant disability   - neg other ROS                 Physical Exam  Normal systems: cardiovascular, pulmonary and dental    Airway   Mallampati: III  TM distance: >3 FB  Neck ROM: full    Dental     Cardiovascular   Rhythm and rate: regular and normal      Pulmonary    breath sounds clear to auscultation    Other findings:   Lab Results      Component                Value               Date                      WBC                      7.3                 06/12/2017            Lab Results      Component                Value               Date                      RBC                      5.00                06/12/2017            Lab Results      Component                Value               Date                      HGB                      14.6                06/12/2017            Lab Results      Component                Value               Date                      HCT                      46.5                06/12/2017            No components  found for: MCT  Lab Results      Component                Value               Date                      MCV                      93                  06/12/2017            Lab Results      Component                Value               Date                      MCH                      29.2                06/12/2017            Lab Results      Component                Value               Date                      MCHC                     31.4                06/12/2017            Lab Results      Component                Value               Date                      RDW                      13.2                06/12/2017            Lab Results      Component                Value               Date                      PLT                      302                 06/12/2017              Last Basic Metabolic Panel:  Lab Results      Component                Value               Date                      NA                       141                 06/12/2017             Lab Results      Component                Value               Date                      POTASSIUM                3.7                 06/12/2017            Lab Results      Component                Value               Date                      CHLORIDE                 105                 06/12/2017            Lab Results      Component                Value               Date                      SCARLETT                      9.2                 06/12/2017            Lab Results      Component                Value               Date                      CO2                      28                  06/12/2017            Lab Results      Component                Value               Date                      BUN                      11                  06/12/2017            Lab Results      Component                Value               Date                      CR                       0.71                06/12/2017            Lab Results      Component                 Value               Date                      GLC                      112                 06/12/2017                     PAC Discussion and Assessment    ASA Classification: 3  Case is suitable for: [employer]  Anesthetic techniques and relevant risks discussed: GA  Invasive monitoring and risk discussed: Yes  Types:   Possibility and Risk of blood transfusion discussed: Yes  NPO instructions given:   Additional anesthetic preparation and risks discussed:   Needs early admission to pre-op area:   Other:     PAC Resident/NP Anesthesia Assessment:  Karolyn Finney is a 48 yo female scheduled for Right Arthroscopic Rotator Cuff Repair, Biceps Tenotomy, Subacromial Decompression on 7/7/2017 by Dr. Garcia. PAC referral by Dr. Garcia for assessment and optimization of anesthesia. Previous anesthesia without complications    1) Cardiac: No known cardiac diagnosis or symptoms.  2) Pulmonary: Intermittent asthma, uses inhalers in the Spring and Fall. Smoked 0.5 PPD for 15 years, quit 4/2017  3) MSK: ongoing right shoulder pain as a result of Right full-thickness supraspinatus and partial-thickness subscapularis tears  4) Endo: BMI 52.27     Feasible airway. Activity is limited by bilateral LE muscle injury and left foot drop. Will use cane for long distances.     Pt also evaluated by Dr. Patton. Please see recommendations below. Labs drawn today.  I spent 20 minutes face to face with pt, assessing, examining, and educating        Reviewed and Signed by PAC Mid-Level Provider/Resident  Mid-Level Provider/Resident: MILTON Good  Date: 6/12/2017  Time: 0715    Attending Anesthesiologist Anesthesia Assessment:        Anesthesiologist:   Date:   Time:   Pass/Fail:   Disposition:     PAC Pharmacist Assessment:        Pharmacist:   Date:   Time:                           .   [Examination Of The Breasts] : a normal appearance [Tenderness Of The Right Breast] : tenderness [No Masses] : no breast masses were palpable [Labia Majora] : normal [Labia Minora] : normal [Normal] : normal [Uterine Adnexae] : normal

## 2021-08-23 NOTE — HISTORY OF PRESENT ILLNESS
[FreeTextEntry1] : patient is here for rigth breast pain\par states is mostly in her nipple and it radiates from her axilla\par also states yesterday it was red and swollen

## 2022-08-29 NOTE — ED PROVIDER NOTE - RELIEVING FACTORS
8/29 Called and left voicemail. Provided phone number [phone] to schedule an appointment with Dr. Ríos.     Avelina canela Procedure   Orthopedics, Podiatry, Sports Medicine, ENT/Eye Specialties  [facility]   [phone]    none

## 2022-12-20 NOTE — ED ADULT NURSE NOTE - INTEGUMENTARY WDL
----- Message from Pascual Olivarez Color consistent with ethnicity/race, warm, dry intact, resilient.

## 2023-04-14 ENCOUNTER — APPOINTMENT (OUTPATIENT)
Dept: OBGYN | Facility: CLINIC | Age: 38
End: 2023-04-14

## 2023-08-21 NOTE — PROCEDURE
Cecily Orantes presents today for   Chief Complaint   Patient presents with    New Patient     Reestablished care       Cecily Orantes preferred language for health care discussion is english/other. Is someone accompanying this pt? no    Is the patient using any DME equipment during OV? no    Depression Screening:  Depression: Not at risk    PHQ-2 Score: 0        Learning Assessment:  Who is the primary learner? Patient    What is the preferred language for health care of the primary learner? ENGLISH    How does the primary learner prefer to learn new concepts? DEMONSTRATION    Answered By patient    Relationship to Learner SELF           Pt currently taking Anticoagulant therapy? Aspirin 81 mg    Pt currently taking Antiplatelet therapy ? no      Coordination of Care:  1. Have you been to the ER, urgent care clinic since your last visit? Hospitalized since your last visit? no    2. Have you seen or consulted any other health care providers outside of the [address] [place] [address] since your last visit? Include any pap smears or colon screening.  no [Cervical Pap Smear] : cervical Pap smear [Liquid Base] : liquid base [Tolerated Well] : the patient tolerated the procedure well [No Complications] : there were no complications

## 2024-02-05 ENCOUNTER — TRANSCRIPTION ENCOUNTER (OUTPATIENT)
Age: 39
End: 2024-02-05

## 2024-02-05 ENCOUNTER — EMERGENCY (EMERGENCY)
Facility: HOSPITAL | Age: 39
LOS: 1 days | Discharge: HOME | End: 2024-02-05
Payer: MEDICAID

## 2024-02-05 VITALS
WEIGHT: 189.6 LBS | OXYGEN SATURATION: 98 % | HEART RATE: 81 BPM | DIASTOLIC BLOOD PRESSURE: 77 MMHG | SYSTOLIC BLOOD PRESSURE: 115 MMHG | TEMPERATURE: 98 F | RESPIRATION RATE: 18 BRPM

## 2024-02-05 DIAGNOSIS — Z98.890 OTHER SPECIFIED POSTPROCEDURAL STATES: Chronic | ICD-10-CM

## 2024-02-05 LAB
ALBUMIN SERPL ELPH-MCNC: 3.6 G/DL — SIGNIFICANT CHANGE UP (ref 3.5–5)
ALP SERPL-CCNC: 94 U/L — SIGNIFICANT CHANGE UP (ref 40–120)
ALT FLD-CCNC: 71 U/L DA — HIGH (ref 10–60)
ANION GAP SERPL CALC-SCNC: 7 MMOL/L — SIGNIFICANT CHANGE UP (ref 5–17)
APPEARANCE UR: CLEAR — SIGNIFICANT CHANGE UP
AST SERPL-CCNC: 32 U/L — SIGNIFICANT CHANGE UP (ref 10–40)
BACTERIA # UR AUTO: NEGATIVE /HPF — SIGNIFICANT CHANGE UP
BASOPHILS # BLD AUTO: 0.09 K/UL — SIGNIFICANT CHANGE UP (ref 0–0.2)
BASOPHILS NFR BLD AUTO: 0.8 % — SIGNIFICANT CHANGE UP (ref 0–2)
BILIRUB SERPL-MCNC: 0.1 MG/DL — LOW (ref 0.2–1.2)
BILIRUB UR-MCNC: NEGATIVE — SIGNIFICANT CHANGE UP
BUN SERPL-MCNC: 16 MG/DL — SIGNIFICANT CHANGE UP (ref 7–18)
CALCIUM SERPL-MCNC: 9.1 MG/DL — SIGNIFICANT CHANGE UP (ref 8.4–10.5)
CHLORIDE SERPL-SCNC: 107 MMOL/L — SIGNIFICANT CHANGE UP (ref 96–108)
CO2 SERPL-SCNC: 21 MMOL/L — LOW (ref 22–31)
COLOR SPEC: YELLOW — SIGNIFICANT CHANGE UP
CREAT SERPL-MCNC: 0.51 MG/DL — SIGNIFICANT CHANGE UP (ref 0.5–1.3)
D DIMER BLD IA.RAPID-MCNC: <150 NG/ML DDU — SIGNIFICANT CHANGE UP
DIFF PNL FLD: NEGATIVE — SIGNIFICANT CHANGE UP
EGFR: 122 ML/MIN/1.73M2 — SIGNIFICANT CHANGE UP
EOSINOPHIL # BLD AUTO: 0.58 K/UL — HIGH (ref 0–0.5)
EOSINOPHIL NFR BLD AUTO: 5 % — SIGNIFICANT CHANGE UP (ref 0–6)
EPI CELLS # UR: PRESENT
GLUCOSE SERPL-MCNC: 96 MG/DL — SIGNIFICANT CHANGE UP (ref 70–99)
GLUCOSE UR QL: NEGATIVE MG/DL — SIGNIFICANT CHANGE UP
HCT VFR BLD CALC: 38 % — SIGNIFICANT CHANGE UP (ref 34.5–45)
HGB BLD-MCNC: 12.6 G/DL — SIGNIFICANT CHANGE UP (ref 11.5–15.5)
IMM GRANULOCYTES NFR BLD AUTO: 0.6 % — SIGNIFICANT CHANGE UP (ref 0–0.9)
KETONES UR-MCNC: NEGATIVE MG/DL — SIGNIFICANT CHANGE UP
LEUKOCYTE ESTERASE UR-ACNC: NEGATIVE — SIGNIFICANT CHANGE UP
LIDOCAIN IGE QN: 52 U/L — SIGNIFICANT CHANGE UP (ref 13–75)
LYMPHOCYTES # BLD AUTO: 38.8 % — SIGNIFICANT CHANGE UP (ref 13–44)
LYMPHOCYTES # BLD AUTO: 4.48 K/UL — HIGH (ref 1–3.3)
MCHC RBC-ENTMCNC: 29.9 PG — SIGNIFICANT CHANGE UP (ref 27–34)
MCHC RBC-ENTMCNC: 33.2 GM/DL — SIGNIFICANT CHANGE UP (ref 32–36)
MCV RBC AUTO: 90.3 FL — SIGNIFICANT CHANGE UP (ref 80–100)
MONOCYTES # BLD AUTO: 0.82 K/UL — SIGNIFICANT CHANGE UP (ref 0–0.9)
MONOCYTES NFR BLD AUTO: 7.1 % — SIGNIFICANT CHANGE UP (ref 2–14)
NEUTROPHILS # BLD AUTO: 5.5 K/UL — SIGNIFICANT CHANGE UP (ref 1.8–7.4)
NEUTROPHILS NFR BLD AUTO: 47.7 % — SIGNIFICANT CHANGE UP (ref 43–77)
NITRITE UR-MCNC: NEGATIVE — SIGNIFICANT CHANGE UP
NRBC # BLD: 0 /100 WBCS — SIGNIFICANT CHANGE UP (ref 0–0)
PH UR: 5.5 — SIGNIFICANT CHANGE UP (ref 5–8)
PLATELET # BLD AUTO: 359 K/UL — SIGNIFICANT CHANGE UP (ref 150–400)
POTASSIUM SERPL-MCNC: 3.7 MMOL/L — SIGNIFICANT CHANGE UP (ref 3.5–5.3)
POTASSIUM SERPL-SCNC: 3.7 MMOL/L — SIGNIFICANT CHANGE UP (ref 3.5–5.3)
PROT SERPL-MCNC: 7.4 G/DL — SIGNIFICANT CHANGE UP (ref 6–8.3)
PROT UR-MCNC: NEGATIVE MG/DL — SIGNIFICANT CHANGE UP
RBC # BLD: 4.21 M/UL — SIGNIFICANT CHANGE UP (ref 3.8–5.2)
RBC # FLD: 12 % — SIGNIFICANT CHANGE UP (ref 10.3–14.5)
RBC CASTS # UR COMP ASSIST: 1 /HPF — SIGNIFICANT CHANGE UP (ref 0–4)
SODIUM SERPL-SCNC: 135 MMOL/L — SIGNIFICANT CHANGE UP (ref 135–145)
SP GR SPEC: 1.02 — SIGNIFICANT CHANGE UP (ref 1–1.03)
TROPONIN I, HIGH SENSITIVITY RESULT: 3.3 NG/L — SIGNIFICANT CHANGE UP
UROBILINOGEN FLD QL: 0.2 MG/DL — SIGNIFICANT CHANGE UP (ref 0.2–1)
WBC # BLD: 11.54 K/UL — HIGH (ref 3.8–10.5)
WBC # FLD AUTO: 11.54 K/UL — HIGH (ref 3.8–10.5)
WBC UR QL: SIGNIFICANT CHANGE UP /HPF (ref 0–5)

## 2024-02-05 PROCEDURE — 71046 X-RAY EXAM CHEST 2 VIEWS: CPT

## 2024-02-05 PROCEDURE — 71046 X-RAY EXAM CHEST 2 VIEWS: CPT | Mod: 26

## 2024-02-05 PROCEDURE — 85379 FIBRIN DEGRADATION QUANT: CPT

## 2024-02-05 PROCEDURE — 80053 COMPREHEN METABOLIC PANEL: CPT

## 2024-02-05 PROCEDURE — 93005 ELECTROCARDIOGRAM TRACING: CPT

## 2024-02-05 PROCEDURE — 36415 COLL VENOUS BLD VENIPUNCTURE: CPT

## 2024-02-05 PROCEDURE — 87086 URINE CULTURE/COLONY COUNT: CPT

## 2024-02-05 PROCEDURE — 99285 EMERGENCY DEPT VISIT HI MDM: CPT | Mod: 25

## 2024-02-05 PROCEDURE — 99285 EMERGENCY DEPT VISIT HI MDM: CPT

## 2024-02-05 PROCEDURE — 83690 ASSAY OF LIPASE: CPT

## 2024-02-05 PROCEDURE — 85025 COMPLETE CBC W/AUTO DIFF WBC: CPT

## 2024-02-05 PROCEDURE — 81001 URINALYSIS AUTO W/SCOPE: CPT

## 2024-02-05 PROCEDURE — 84484 ASSAY OF TROPONIN QUANT: CPT

## 2024-02-05 RX ORDER — LIDOCAINE 4 G/100G
1 CREAM TOPICAL ONCE
Refills: 0 | Status: COMPLETED | OUTPATIENT
Start: 2024-02-05 | End: 2024-02-05

## 2024-02-05 RX ORDER — METHOCARBAMOL 500 MG/1
1500 TABLET, FILM COATED ORAL ONCE
Refills: 0 | Status: COMPLETED | OUTPATIENT
Start: 2024-02-05 | End: 2024-02-05

## 2024-02-05 RX ORDER — METHOCARBAMOL 500 MG/1
2 TABLET, FILM COATED ORAL
Qty: 18 | Refills: 0
Start: 2024-02-05 | End: 2024-02-07

## 2024-02-05 RX ADMIN — METHOCARBAMOL 1500 MILLIGRAM(S): 500 TABLET, FILM COATED ORAL at 21:51

## 2024-02-05 RX ADMIN — LIDOCAINE 1 PATCH: 4 CREAM TOPICAL at 21:51

## 2024-02-05 NOTE — ED PROVIDER NOTE - NSFOLLOWUPINSTRUCTIONS_ED_ALL_ED_FT
Follow up with the primary care doctor in 3-5 days.  If you experience any new or worsening symptoms or if you are concerned you can always come back to the emergency for a re-evaluation.  Some results may not be available at the time of your discharge from the hospital. You can download the FOLLOW MY HEALTH jenaro and have access to these results.  If there were any prescriptions given to you during the visit today take them as prescribed. If you have any questions you can ask the pharmacist.

## 2024-02-05 NOTE — ED PROVIDER NOTE - PATIENT PORTAL LINK FT
You can access the FollowMyHealth Patient Portal offered by [facility] by registering at the following website: [email or web address]. By joining [employer]’s FollowMyHealth portal, you will also be able to view your health information using other applications (apps) compatible with our system.

## 2024-02-05 NOTE — ED PROVIDER NOTE - CLINICAL SUMMARY MEDICAL DECISION MAKING FREE TEXT BOX
38-year-old female, presents for evaluation of worsening left flank/left-sided rib cage pain for 4 days.  Atraumatic.  No recent viral or febrile syndrome.  Pain reproducible with palpation and with certain movements.  EKG shows NSR at 79 without any ischemic or arrhythmic changes.  Chest x-ray negative for acute findings.  Labs grossly unremarkable with negative troponin and negative D-dimer.  Heart score 1.  At this time presenting symptoms strongly suggestive of musculoskeletal pain.  Will give trial of Naprosyn, Robaxin and patient will need to follow-up with PMD in 3-5 days.  Discussed red flags to come back to the hospital earlier.

## 2024-02-05 NOTE — ED PROVIDER NOTE - NSFOLLOWUPCLINICS_GEN_ALL_ED_FT
[facility]  Internal Medicine  [address] [place] [address]  Phone: [phone]  Fax: [phone]

## 2024-02-05 NOTE — ED ADULT NURSE NOTE - NSFALLUNIVINTERV_ED_ALL_ED
Bed/Stretcher in lowest position, wheels locked, appropriate side rails in place/Call bell, personal items and telephone in reach/Instruct patient to call for assistance before getting out of bed/chair/stretcher/Non-slip footwear applied when patient is off stretcher/[place] to call system/Physically safe environment - no spills, clutter or unnecessary equipment/Purposeful proactive rounding/Room/bathroom lighting operational, light cord in reach

## 2024-02-05 NOTE — ED ADULT TRIAGE NOTE - SPO2 (%)
98 Double O-Z Plasty Text: The defect edges were debeveled with a #15 scalpel blade.  Given the location of the defect, shape of the defect and the proximity to free margins a Double O-Z plasty (double transposition flap) was deemed most appropriate.  Using a sterile surgical marker, the appropriate transposition flaps were drawn incorporating the defect and placing the expected incisions within the relaxed skin tension lines where possible. The area thus outlined was incised deep to adipose tissue with a #15 scalpel blade.  The skin margins were undermined to an appropriate distance in all directions utilizing iris scissors.  Hemostasis was achieved with electrocautery.  The flaps were then transposed into place, one clockwise and the other counterclockwise, and anchored with interrupted buried subcutaneous sutures.

## 2024-02-05 NOTE — ED ADULT NURSE NOTE - OBJECTIVE STATEMENT
37 yo female sitting on a chair c/o left mid back pain radiating to left breast that started 4 days ago.

## 2024-02-05 NOTE — ED PROVIDER NOTE - PHYSICAL EXAMINATION
no spinal/paraspinal tenderness to palpation.  No rash to the flank or abdominal area.  Left-sided CVA tenderness.  Mild left lower anterior rib cage tenderness.  No crepitus.

## 2024-02-05 NOTE — ED PROVIDER NOTE - OBJECTIVE STATEMENT
38-year-old female, no significant past medical history, presents for evaluation of left flank pain for 4 days.  Gradual onset of sharp severe pain radiating underneath the left breast.  Pain is persistent.  Pain is worse with certain movement.  Try to take ibuprofen with minimal improvement of pain.  Denies any associated chest pain, shortness of breath or palpitations.  Denies any radiation to the abdomen.  Food intake does not influence the pain.  Denies any associated nausea/vomiting/diarrhea, urinary symptoms, rash or any other complaints.  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  No recent viral syndrome. 38-year-old female, no significant past medical history, presents for evaluation of left flank pain for 4 days.  Gradual onset of sharp severe pain radiating underneath the left breast.  Pain is persistent.  Pain is worse with certain movement.  Try to take ibuprofen with minimal improvement of pain.  Denies any associated chest pain, shortness of breath or palpitations.  Denies any radiation to the abdomen.  Food intake does not influence the pain.  Denies any associated nausea/vomiting/diarrhea, urinary symptoms, rash or any other complaints.  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  No recent viral syndrome. Traveled by flight (~3 hours) from [place] 2 weeks ago.

## 2024-02-07 ENCOUNTER — EMERGENCY (EMERGENCY)
Facility: HOSPITAL | Age: 39
LOS: 1 days | Discharge: HOME | End: 2024-02-07
Attending: STUDENT IN AN ORGANIZED HEALTH CARE EDUCATION/TRAINING PROGRAM
Payer: MEDICAID

## 2024-02-07 VITALS
TEMPERATURE: 98 F | WEIGHT: 169.09 LBS | DIASTOLIC BLOOD PRESSURE: 96 MMHG | SYSTOLIC BLOOD PRESSURE: 137 MMHG | HEART RATE: 81 BPM | RESPIRATION RATE: 18 BRPM | OXYGEN SATURATION: 98 % | HEIGHT: 59 IN

## 2024-02-07 DIAGNOSIS — Z98.890 OTHER SPECIFIED POSTPROCEDURAL STATES: Chronic | ICD-10-CM

## 2024-02-07 LAB
CULTURE RESULTS: SIGNIFICANT CHANGE UP
SPECIMEN SOURCE: SIGNIFICANT CHANGE UP

## 2024-02-07 PROCEDURE — 99284 EMERGENCY DEPT VISIT MOD MDM: CPT

## 2024-02-07 PROCEDURE — 99283 EMERGENCY DEPT VISIT LOW MDM: CPT

## 2024-02-07 NOTE — ED ADULT TRIAGE NOTE - CHIEF COMPLAINT QUOTE
Patient c/o left neck and left arm pain x Monday. Reports stiffness to left neck area and left arm x today, and left leg numbness. Denies falls/injuries/trauma. Denies chest pain.

## 2024-02-07 NOTE — ED PROVIDER NOTE - CLINICAL SUMMARY MEDICAL DECISION MAKING FREE TEXT BOX
After introducing myself to the patient and/or family I have performed a medical history, review of systems, and physical examination. I have formulated a differential diagnosis and plan of care for this visit and discussed this with the patient and/or family. I have also addressed the risks and benefits of diagnostic and treatment modalities planned for this visit.     Vital Signs: Reviewed the patient's vital signs     Nursing Notes: Reviewed and utilized the nursing notes     Old Medical Records: The patient's available past medical records and past encounters were reviewed     Laboratory Studies: Ordered and independently interpreted laboratory test, see above     Imaging Studies: Imaging studies ordered. Radiologist interpretation reviewed. I have independently reviewed imaging.    Examination of left cervical spine demonstrates palpable muscle spasm and tenderness. No trauma reported so imaging deferred.    Patient instructed to take Ibuprofen Q8 and  follow-up with PCP if symptoms persist for physical therapy referral. Essential hypertension

## 2024-02-07 NOTE — ED PROVIDER NOTE - OBJECTIVE STATEMENT
38 F Patient c/o left neck and left arm pain x Monday. Reports stiffness to left neck area and left arm x today, and left leg numbness. Denies falls/injuries/trauma. Denies chest pain.  · Chief Complaint	neck pain, left arm pain, left leg numbness 39 y/o F presented to the ED for left arm/neck pain. States pain started Monday. Reports associated stiffness with pain. Denies any recent falls or trauma to her neck or arm. Denies chest pain or SOB.       GENERAL APPEARANCE:  AxOx4, generally well-appearing, no acute distress.  HEENT:  NC, AT. MMM. EOMI, clear conjunctiva, oropharynx clear.  NECK:  Supple without lymphadenopathy.  No stiffness or restricted ROM.  HEART:  Normal rate and regular rhythm, normal S1/S1, no m/r/g  LUNGS:  CTAB, moving air well. No crackles or wheezes are heard.  ABDOMEN:  Soft, nontender, nondistended with good bowel sounds heard.  BACK: No CVAT, no obvious deformity. Left cervical paravertebral muscle tenderness/palpable spasm.  EXTREMITIES:  Without cyanosis, clubbing or edema.  NEUROLOGICAL:  Grossly nonfocal. Alert and oriented, moving all 4 extremities. CN II-XII grossly intact. Observed to ambulate with normal gait.  Skin:  Warm and dry without any rash.

## 2024-02-07 NOTE — ED PROVIDER NOTE - PATIENT PORTAL LINK FT
You can access the FollowMyHealth Patient Portal offered by [facility] by registering at the following website: [email or web address]. By joining [employer]’s FollowMyHealth portal, you will also be able to view your health information using other applications (apps) compatible with our system.

## 2024-03-13 NOTE — ED ADULT NURSE NOTE - OBJECTIVE STATEMENT
Take xyzal every night before going to bed  Remove pet from room at night for 7 days   Patient c/o left neck and left arm pain x Monday. Reports stiffness to left neck area and left arm x today, and left leg numbness.

## 2024-04-08 ENCOUNTER — APPOINTMENT (OUTPATIENT)
Dept: OBGYN | Facility: CLINIC | Age: 39
End: 2024-04-08
Payer: MEDICAID

## 2024-04-08 ENCOUNTER — LABORATORY RESULT (OUTPATIENT)
Age: 39
End: 2024-04-08

## 2024-04-08 VITALS — SYSTOLIC BLOOD PRESSURE: 128 MMHG | DIASTOLIC BLOOD PRESSURE: 86 MMHG | WEIGHT: 186 LBS | BODY MASS INDEX: 37.57 KG/M2

## 2024-04-08 DIAGNOSIS — N39.46 MIXED INCONTINENCE: ICD-10-CM

## 2024-04-08 DIAGNOSIS — N64.3 GALACTORRHEA NOT ASSOCIATED WITH CHILDBIRTH: ICD-10-CM

## 2024-04-08 DIAGNOSIS — Z01.419 ENCOUNTER FOR GYNECOLOGICAL EXAMINATION (GENERAL) (ROUTINE) W/OUT ABNORMAL FINDINGS: ICD-10-CM

## 2024-04-08 PROCEDURE — 99396 PREV VISIT EST AGE 40-64: CPT

## 2024-04-08 PROCEDURE — 99213 OFFICE O/P EST LOW 20 MIN: CPT | Mod: TH,25

## 2024-04-08 NOTE — HISTORY OF PRESENT ILLNESS
[FreeTextEntry1] : pt comes for pap and annual . She also needs mammogram.  She has left breast discharge . Clear , one year . No blood or pain . Stoped using thew medication for incontinence . She wants to continue the medications .

## 2024-04-08 NOTE — PLAN
[FreeTextEntry1] : pap , diet exercise , oxybutin , kegels , breast cytology , mammogram and ultrasound of the breast .

## 2024-04-08 NOTE — PHYSICAL EXAM
[Appropriately responsive] : appropriately responsive [Alert] : alert [No Acute Distress] : no acute distress [No Lymphadenopathy] : no lymphadenopathy [Regular Rate Rhythm] : regular rate rhythm [No Murmurs] : no murmurs [Clear to Auscultation B/L] : clear to auscultation bilaterally [Soft] : soft [Non-tender] : non-tender [Non-distended] : non-distended [No HSM] : No HSM [No Lesions] : no lesions [No Mass] : no mass [Oriented x3] : oriented x3 [FreeTextEntry6] : right nipple dischagre , clear , cytology collected .  [Examination Of The Breasts] : a normal appearance [No Masses] : no breast masses were palpable [Labia Majora] : normal [Labia Minora] : normal [Normal] : normal [Uterine Adnexae] : normal

## 2025-03-01 NOTE — H&P PST ADULT - SOURCE OF INFORMATION, PROFILE
Your rapid flu panel was negative for influenza A, B, COVID-19.    You have an upper respiratory infection. Mostly, these are caused by viruses and treatment is as follows. Symptoms may last for up to 1-2 weeks, but follow up with PCP if your symptoms start worsening.  For muscle aches, fever, chills: Acetaminophen or Ibuprofen, Advil, or Aleve can be used.  Hydration: Maintain adequate hydration with water.  Nasal symptoms: Nasal Saline available over-the-counter, can be used 3-4 times per day  Decongestants reduce nasal congestion and discharge  Vaseline at opening of nares may reduce irritation   Cool Mist Humidifier may loosens discharge  Cough Suppressants/decongestant will be given, use as directed.     Antibiotics are not indicated for treatment, as antibiotics do not treat viruses.     
patient